# Patient Record
Sex: FEMALE | Race: BLACK OR AFRICAN AMERICAN | NOT HISPANIC OR LATINO | Employment: PART TIME | URBAN - METROPOLITAN AREA
[De-identification: names, ages, dates, MRNs, and addresses within clinical notes are randomized per-mention and may not be internally consistent; named-entity substitution may affect disease eponyms.]

---

## 2017-03-09 ENCOUNTER — OFFICE VISIT (OUTPATIENT)
Dept: OBSTETRICS AND GYNECOLOGY | Facility: CLINIC | Age: 27
End: 2017-03-09
Payer: MEDICAID

## 2017-03-09 ENCOUNTER — TELEPHONE (OUTPATIENT)
Dept: OBSTETRICS AND GYNECOLOGY | Facility: CLINIC | Age: 27
End: 2017-03-09

## 2017-03-09 ENCOUNTER — LAB VISIT (OUTPATIENT)
Dept: LAB | Facility: HOSPITAL | Age: 27
End: 2017-03-09
Attending: OBSTETRICS & GYNECOLOGY
Payer: MEDICAID

## 2017-03-09 VITALS
TEMPERATURE: 99 F | BODY MASS INDEX: 19.2 KG/M2 | HEIGHT: 66 IN | DIASTOLIC BLOOD PRESSURE: 64 MMHG | SYSTOLIC BLOOD PRESSURE: 120 MMHG | WEIGHT: 119.5 LBS

## 2017-03-09 DIAGNOSIS — O26.849 UTERINE SIZE DATE DISCREPANCY, UNSPECIFIED TRIMESTER: ICD-10-CM

## 2017-03-09 DIAGNOSIS — Z3A.09 9 WEEKS GESTATION OF PREGNANCY: Primary | ICD-10-CM

## 2017-03-09 DIAGNOSIS — N92.6 MISSED PERIOD: ICD-10-CM

## 2017-03-09 DIAGNOSIS — Z34.91 FIRST TRIMESTER PREGNANCY: ICD-10-CM

## 2017-03-09 LAB
ABO + RH BLD: NORMAL
B-HCG UR QL: POSITIVE
BASOPHILS # BLD AUTO: 0.01 K/UL
BASOPHILS NFR BLD: 0.2 %
BLD GP AB SCN CELLS X3 SERPL QL: NORMAL
CTP QC/QA: YES
DIFFERENTIAL METHOD: ABNORMAL
EOSINOPHIL # BLD AUTO: 0.1 K/UL
EOSINOPHIL NFR BLD: 2.2 %
ERYTHROCYTE [DISTWIDTH] IN BLOOD BY AUTOMATED COUNT: 13 %
HCT VFR BLD AUTO: 36.7 %
HGB BLD-MCNC: 12.9 G/DL
HGB S BLD QL SOLY: NEGATIVE
LYMPHOCYTES # BLD AUTO: 1.4 K/UL
LYMPHOCYTES NFR BLD: 24.3 %
MCH RBC QN AUTO: 28.5 PG
MCHC RBC AUTO-ENTMCNC: 35.1 %
MCV RBC AUTO: 81 FL
MONOCYTES # BLD AUTO: 0.6 K/UL
MONOCYTES NFR BLD: 10.5 %
NEUTROPHILS # BLD AUTO: 3.6 K/UL
NEUTROPHILS NFR BLD: 62.5 %
PLATELET # BLD AUTO: 334 K/UL
PMV BLD AUTO: 9.7 FL
RBC # BLD AUTO: 4.52 M/UL
WBC # BLD AUTO: 5.8 K/UL

## 2017-03-09 PROCEDURE — 86762 RUBELLA ANTIBODY: CPT

## 2017-03-09 PROCEDURE — 86803 HEPATITIS C AB TEST: CPT

## 2017-03-09 PROCEDURE — 85660 RBC SICKLE CELL TEST: CPT

## 2017-03-09 PROCEDURE — 85025 COMPLETE CBC W/AUTO DIFF WBC: CPT

## 2017-03-09 PROCEDURE — 36415 COLL VENOUS BLD VENIPUNCTURE: CPT

## 2017-03-09 PROCEDURE — 86703 HIV-1/HIV-2 1 RESULT ANTBDY: CPT

## 2017-03-09 PROCEDURE — 87340 HEPATITIS B SURFACE AG IA: CPT

## 2017-03-09 PROCEDURE — 99213 OFFICE O/P EST LOW 20 MIN: CPT | Performed by: OBSTETRICS & GYNECOLOGY

## 2017-03-09 PROCEDURE — 87591 N.GONORRHOEAE DNA AMP PROB: CPT

## 2017-03-09 PROCEDURE — 83036 HEMOGLOBIN GLYCOSYLATED A1C: CPT

## 2017-03-09 PROCEDURE — 87086 URINE CULTURE/COLONY COUNT: CPT

## 2017-03-09 PROCEDURE — 86900 BLOOD TYPING SEROLOGIC ABO: CPT

## 2017-03-09 PROCEDURE — 86592 SYPHILIS TEST NON-TREP QUAL: CPT

## 2017-03-09 PROCEDURE — 99204 OFFICE O/P NEW MOD 45 MIN: CPT | Mod: TH,S$PBB,, | Performed by: OBSTETRICS & GYNECOLOGY

## 2017-03-09 PROCEDURE — 86850 RBC ANTIBODY SCREEN: CPT

## 2017-03-09 NOTE — TELEPHONE ENCOUNTER
RETURNED CALL TO PHARMACIST AT [facility] , SHE STATED THE INSURANCE & PT PREFERS A CHEAPER PRENATAL FOR THE PT, INFORMED HER TO HAVE A PRENATAL CLOSEST TO THE ONE DR MONTGOMERY HAS ORDERED BUT WOULD BE COST EFFECTIVE FOR THE PT,  PHARMACIST STATED HER UNDERSTANDING

## 2017-03-09 NOTE — MR AVS SNAPSHOT
[facility] [place] - OB/ GYN  [address]  [address]  [place] [address]  Phone: [phone]                  Taryn Ferrara   3/9/2017 10:00 AM   Office Visit    Description:  Female : 1990   Provider:  Adonis Sims MD   Department:  [facility] OB/ GYN           Reason for Visit     Possible Pregnancy           Diagnoses this Visit        Comments    9 weeks gestation of pregnancy    -  Primary     Missed period         First trimester pregnancy         Uterine size date discrepancy, unspecified trimester                To Do List           Future Appointments        Provider Department Dept Phone    2017 9:30 AM Adonis Sims MD [facility] OB/ -[phone]      Goals (5 Years of Data)     None      Follow-Up and Disposition     Return in about 4 weeks (around 2017).      [facility]     [facility] Nurse Care [facility] -  Assistance  Registered nurses in the [facility] provide clinical advisement, health education, appointment booking, and other advisory services.  Call for this free service at [phone].             Medications           Message regarding Medications     Verify the changes and/or additions to your medication regime listed below are the same as discussed with your clinician today.  If any of these changes or additions are incorrect, please notify your healthcare provider.        STOP taking these medications     sumatriptan (IMITREX) 50 MG tablet Take 1 tablet (50 mg total) by mouth once. Take 1 dose for migraine, may repeat with 2nd dose 2 hours later if headache persists. Do not take more than 4 tablets in 1 day           Verify that the below list of medications is an accurate representation of the medications you are currently taking.  If none reported, the list may be blank. If incorrect, please contact your healthcare provider. Carry this list with you in case of emergency.           Current Medications            Clinical Reference  "Information           Your Vitals Were     BP Temp Height    120/64 (BP Location: Left arm, Patient Position: Sitting, BP Method: Manual) 98.6 °F (37 °C) (Oral) 5' 6" (1.676 m)    Weight Last Period BMI    54.2 kg (119 lb 7.8 oz) 12/01/2016 (Within Days) 19.29 kg/m2      Blood Pressure          Most Recent Value    BP  120/64      Allergies as of 3/9/2017     No Known Allergies      Immunizations Administered on Date of Encounter - 3/9/2017     None      Orders Placed During Today's Visit      Normal Orders This Visit    C. trachomatis/N. gonorrhoeae by AMP DNA Cervix     POCT urine pregnancy     Urine culture     US OB/GYN Procedure ([facility])-Today     Future Labs/Procedures Expected by Expires    CBC auto differential  3/9/2017 5/8/2018    Hemoglobin A1c  3/9/2017 5/8/2018    Hepatitis B surface antigen  3/9/2017 5/8/2018    Hepatitis C antibody  3/9/2017 3/9/2018    HIV-1 and HIV-2 antibodies  3/9/2017 5/8/2018    RPR  3/9/2017 5/8/2018    Rubella antibody, IgG  3/9/2017 5/8/2018    Sickle cell screen  3/9/2017 3/9/2018    Type & Screen  3/9/2017 5/8/2018         3/9/2017 10:26 AM - Elana Danielle MA      Component Results     Component Value Flag Ref Range Units Status    POC Preg Test, Ur Positive (A) Negative  Final     Acceptable Yes    Final            Language Assistance Services     ATTENTION: Language assistance services are available, free of charge. Please call [phone].      ATENCIÓN: Si habla español, tiene a ye disposición servicios gratuitos de asistencia lingüística. Llame al [phone].     CHÚ Ý: N?u b?n nói Ti?ng Vi?t, có các d?ch v? h? tr? ngôn ng? mi?n phí dành cho b?n. G?i s? [phone].         [facility] [place] - OB/ GYN complies with applicable Federal civil rights laws and does not discriminate on the basis of race, color, national origin, age, disability, or sex.        "

## 2017-03-09 NOTE — PROGRESS NOTES
Subjective:       Patient ID: Taryn Ferrara is a 26 y.o. female.    Chief Complaint:  Possible Pregnancy (Confirmation)      History of Present Illness  HPI  Missed Menses/ Possible Pregnancy  Patient complains of Positive home pregnancy test on 2017. She believes she could be pregnant. Pregnancy is desired. Sexual Activity: single partner, contraception: none. Current symptoms also include: breast tenderness, fatigue, frequent urination and positive home pregnancy test. Last period was normal.     Patient's last menstrual period was 2016 (within days).     By date, she should be about 14 weeks with EDC of 2017.      GYN & OB History  Patient's last menstrual period was 2016 (within days).   Date of Last Pap: No result found    OB History    Para Term  AB SAB TAB Ectopic Multiple Living   1               # Outcome Date GA Lbr Humberto/2nd Weight Sex Delivery Anes PTL Lv   1 Current                 Past Medical History:   Diagnosis Date    Migraine headache        Past Surgical History:   Procedure Laterality Date    TRACHEOSTOMY TUBE PLACEMENT         Family History   Problem Relation Age of Onset    Diabetes Neg Hx     Hypertension Neg Hx        Social History     Social History    Marital status: Single     Spouse name: N/A    Number of children: N/A    Years of education: N/A     Social History Main Topics    Smoking status: Former Smoker     Quit date: 3/2/2014    Smokeless tobacco: Never Used    Alcohol use Yes      Comment: ocassionally    Drug use: No    Sexual activity: Yes     Partners: Male     Birth control/ protection: None     Other Topics Concern    None     Social History Narrative    Together since early     He is in school.  [employer].  Working delivering fast food    She is at  [employer].  Also a security at [employer]       Current Outpatient Prescriptions   Medication Sig Dispense Refill    prenatal vitamin18-iron-FA-DSS (INATAL  ULTRA) 90-1-50 mg Tab Take 1 capsule by mouth once daily. 30 tablet 6     No current facility-administered medications for this visit.        Review of patient's allergies indicates:  No Known Allergies    Review of Systems  Review of Systems   Constitutional: Positive for fatigue. Negative for activity change, appetite change, fever and unexpected weight change.   Respiratory: Negative for cough, shortness of breath and wheezing.    Cardiovascular: Negative for chest pain and palpitations.   Gastrointestinal: Positive for abdominal pain, constipation and nausea. Negative for vomiting.   Endocrine: Negative for hot flashes.   Genitourinary: Positive for frequency, pelvic pain and vaginal discharge. Negative for dyspareunia, urgency, vaginal bleeding, dysmenorrhea and postcoital bleeding.   Musculoskeletal: Positive for back pain. Negative for myalgias.   Skin:  Negative for rash.   Neurological: Positive for headaches. Negative for seizures.   Psychiatric/Behavioral: Negative for depression and sleep disturbance. The patient is not nervous/anxious.    Breast: Negative for breast mass, breast pain and nipple discharge          Objective:    Physical Exam:   Constitutional: She appears well-developed and well-nourished. No distress.    HENT:   Head: Normocephalic and atraumatic.    Eyes: EOM are normal.    Neck: Normal range of motion.     Pulmonary/Chest: Effort normal. No respiratory distress.   Breasts: Non-tender, no engorgement, no masses, no retraction, no discharge. Negative for lymphadenopathy.         Abdominal: Soft. She exhibits no distension. There is no tenderness. There is no rebound and no guarding.     Genitourinary: Vagina normal. No vaginal discharge found.   Genitourinary Comments: Vulva without any obvious lesions.  Vaginal vault with good support.  Minimal discharge noted.  No obvious lesion.  Cervix is without any cervical motion tenderness.  No obvious lesion.  Uterus is about 10 weeks,  non-tender, normal contour.  Adnexa is without any masses or tenderness.           Musculoskeletal: Normal range of motion.       Neurological: She is alert.    Skin: Skin is warm and dry.    Psychiatric: She has a normal mood and affect.        A pelvic ultrasound performed showing a normal fetus at about 9w1d.  EDC would be 10/12/2017.       Assessment:        1. 9 weeks gestation of pregnancy    2. Missed period    3. First trimester pregnancy    4. Uterine size date discrepancy, unspecified trimester             Plan:      I have discussed with the patient her condition  She is doing well so far in her early pregnancy  She is taking over-the-counter prenatal vitamins; Inatal Ultra given.  Gonorrhea and chlamydia performed  Prenatal profile ordered  She will be back in about 2-4 weeks for follow-up

## 2017-03-09 NOTE — TELEPHONE ENCOUNTER
----- Message from Mary Jane Holland sent at 3/9/2017 11:56 AM CST -----  Contact: [facility] Perales is requesting that pt prenatal vitamin be changed to a cheaper brand. Please contact her at [phone].    Thanks

## 2017-03-10 LAB
ESTIMATED AVG GLUCOSE: 97 MG/DL
HBA1C MFR BLD HPLC: 5 %
HBV SURFACE AG SERPL QL IA: NEGATIVE
HCV AB SERPL QL IA: NEGATIVE
HIV 1+2 AB+HIV1 P24 AG SERPL QL IA: NEGATIVE
RPR SER QL: NORMAL
RUBV IGG SER-ACNC: 25.4 IU/ML
RUBV IGG SER-IMP: REACTIVE

## 2017-03-11 LAB — BACTERIA UR CULT: NORMAL

## 2017-03-13 LAB
C TRACH DNA SPEC QL NAA+PROBE: NEGATIVE
N GONORRHOEA DNA SPEC QL NAA+PROBE: NEGATIVE

## 2017-03-30 DIAGNOSIS — Z3A.09 9 WEEKS GESTATION OF PREGNANCY: ICD-10-CM

## 2017-03-30 DIAGNOSIS — Z34.91 FIRST TRIMESTER PREGNANCY: ICD-10-CM

## 2017-03-31 ENCOUNTER — TELEPHONE (OUTPATIENT)
Dept: OBSTETRICS AND GYNECOLOGY | Facility: CLINIC | Age: 27
End: 2017-03-31

## 2017-03-31 NOTE — TELEPHONE ENCOUNTER
----- Message from Yadira Vázquez sent at 3/31/2017  1:01 PM CDT -----  Contact: Pharmacy  Pharmacy calling to see if they can change prenatal vitamins to something covered by insurance. Please call [phone]      03/31/2017 1:23 PM  Told pharmacist to give pt what's covered by her inaruance

## 2017-04-06 ENCOUNTER — ROUTINE PRENATAL (OUTPATIENT)
Dept: OBSTETRICS AND GYNECOLOGY | Facility: CLINIC | Age: 27
End: 2017-04-06
Payer: MEDICAID

## 2017-04-06 VITALS — WEIGHT: 122 LBS | SYSTOLIC BLOOD PRESSURE: 112 MMHG | BODY MASS INDEX: 19.69 KG/M2 | DIASTOLIC BLOOD PRESSURE: 62 MMHG

## 2017-04-06 DIAGNOSIS — Z34.91 FIRST TRIMESTER PREGNANCY: Primary | ICD-10-CM

## 2017-04-06 DIAGNOSIS — Z3A.13 13 WEEKS GESTATION OF PREGNANCY: ICD-10-CM

## 2017-04-06 PROCEDURE — 99212 OFFICE O/P EST SF 10 MIN: CPT | Mod: PBBFAC | Performed by: OBSTETRICS & GYNECOLOGY

## 2017-04-06 PROCEDURE — 99999 PR PBB SHADOW E&M-EST. PATIENT-LVL II: CPT | Mod: PBBFAC,,, | Performed by: OBSTETRICS & GYNECOLOGY

## 2017-04-06 PROCEDURE — 99212 OFFICE O/P EST SF 10 MIN: CPT | Mod: TH,S$PBB,, | Performed by: OBSTETRICS & GYNECOLOGY

## 2017-04-06 NOTE — PROGRESS NOTES
Initial OB with MARIA LUZ 10/12/2017 based on ultrasound from 3/9/2017. Patient has no complaints. sal

## 2017-04-06 NOTE — PROGRESS NOTES
Here for follow-up  Feeling better with some weight gain    Review of prenatal profile.  All normal    Continue with prenatal vitamins  Back in 4 weeks.

## 2017-04-06 NOTE — MR AVS SNAPSHOT
[facility] - OB/ GYN  [address]  [place] [address]  Phone: [phone]                  Taryn Ferrara   2017 9:30 AM   Routine Prenatal    Description:  Female : 1990   Provider:  Adonis Sims MD   Department:  [facility] - OB/ GYN           Reason for Visit     Initial Prenatal Visit                To Do List           Future Appointments        Provider Department Dept Phone    2017 9:10 AM Adonis Sims MD [facility] [place] OB/ -[phone]      Goals (5 Years of Data)     None      [facility]     [facility] On [facility] Nurse Care Line -  Assistance  Unless otherwise directed by your provider, please contact [facility], our nurse care line that is available for  assistance.     Registered nurses in the [facility] provide: appointment scheduling, clinical advisement, health education, and other advisory services.  Call: [phone] (toll free)               Medications           Message regarding Medications     Verify the changes and/or additions to your medication regime listed below are the same as discussed with your clinician today.  If any of these changes or additions are incorrect, please notify your healthcare provider.             Verify that the below list of medications is an accurate representation of the medications you are currently taking.  If none reported, the list may be blank. If incorrect, please contact your healthcare provider. Carry this list with you in case of emergency.           Current Medications     prenatal vitamin18-iron-FA-DSS (INATAL ULTRA) 90-1-50 mg Tab Take 1 capsule by mouth once daily.           Clinical Reference Information           Prenatal Vitals     Enc. Date GA Prenatal Vitals Prenatal Pulse Pain Level Urine Albumin/Glucose Edema Presentation Dilation/Effacement/Station    17 18w0d 112/62 / 55.3 kg (122 lb)    Negative / Negative          TW.907 kg (2 lb)   Pregravid weight: 54.4 kg (120 lb)    Number of fetuses: 1       Your Vitals Were     BP Weight Last Period BMI       112/62 55.3 kg (122 lb) 12/01/2016 (Within Days) 19.69 kg/m2       Allergies as of 4/6/2017     No Known Allergies      Immunizations Administered on Date of Encounter - 4/6/2017     None      Language Assistance Services     ATTENTION: Language assistance services are available, free of charge. Please call [phone].      ATENCIÓN: Si habla español, tiene a ye disposición servicios gratuitos de asistencia lingüística. Llame al [phone].     [facility] Ý: N?u b?n nói Ti?ng Vi?t, có các d?ch v? h? tr? ngôn ng? mi?n phí dành cho b?n. G?i s? [phone].         [facility] [place] - OB/ GYN complies with applicable Federal civil rights laws and does not discriminate on the basis of race, color, national origin, age, disability, or sex.

## 2017-04-20 ENCOUNTER — PATIENT MESSAGE (OUTPATIENT)
Dept: OBSTETRICS AND GYNECOLOGY | Facility: CLINIC | Age: 27
End: 2017-04-20

## 2017-04-24 DIAGNOSIS — Z3A.09 9 WEEKS GESTATION OF PREGNANCY: ICD-10-CM

## 2017-04-24 DIAGNOSIS — Z34.91 FIRST TRIMESTER PREGNANCY: ICD-10-CM

## 2017-05-03 ENCOUNTER — LAB VISIT (OUTPATIENT)
Dept: LAB | Facility: HOSPITAL | Age: 27
End: 2017-05-03
Attending: OBSTETRICS & GYNECOLOGY
Payer: MEDICAID

## 2017-05-03 ENCOUNTER — ROUTINE PRENATAL (OUTPATIENT)
Dept: OBSTETRICS AND GYNECOLOGY | Facility: CLINIC | Age: 27
End: 2017-05-03
Payer: MEDICAID

## 2017-05-03 VITALS — SYSTOLIC BLOOD PRESSURE: 110 MMHG | BODY MASS INDEX: 20.6 KG/M2 | DIASTOLIC BLOOD PRESSURE: 64 MMHG | WEIGHT: 127.63 LBS

## 2017-05-03 DIAGNOSIS — Z34.92 SECOND TRIMESTER PREGNANCY: ICD-10-CM

## 2017-05-03 DIAGNOSIS — Z3A.17 17 WEEKS GESTATION OF PREGNANCY: ICD-10-CM

## 2017-05-03 DIAGNOSIS — Z3A.17 17 WEEKS GESTATION OF PREGNANCY: Primary | ICD-10-CM

## 2017-05-03 PROCEDURE — 99212 OFFICE O/P EST SF 10 MIN: CPT | Mod: TH,S$PBB,, | Performed by: OBSTETRICS & GYNECOLOGY

## 2017-05-03 PROCEDURE — 81511 FTL CGEN ABNOR FOUR ANAL: CPT

## 2017-05-03 PROCEDURE — 36415 COLL VENOUS BLD VENIPUNCTURE: CPT

## 2017-05-03 PROCEDURE — 99999 PR PBB SHADOW E&M-EST. PATIENT-LVL II: CPT | Mod: PBBFAC,,, | Performed by: OBSTETRICS & GYNECOLOGY

## 2017-05-03 NOTE — MR AVS SNAPSHOT
[facility] - OB/ GYN  [address]  [place] [address]  Phone: [phone]                  Taryn Ferrara   5/3/2017 2:10 PM   Routine Prenatal    Description:  Female : 1990   Provider:  Adonis Sims MD   Department:  [facility] - OB/ GYN           Reason for Visit     Routine Prenatal Visit                To Do List           Future Appointments        Provider Department Dept Phone    2017 1:10 PM Adonis Sims MD [facility] [place] [facility]/ -[phone]      Goals (5 Years of Data)     None      [facility]     [facility] On Call Nurse Care Line -  Assistance  Unless otherwise directed by your provider, please contact [facility], our nurse care line that is available for  assistance.     Registered nurses in the [facility] provide: appointment scheduling, clinical advisement, health education, and other advisory services.  Call: [phone] (toll free)               Medications           Message regarding Medications     Verify the changes and/or additions to your medication regime listed below are the same as discussed with your clinician today.  If any of these changes or additions are incorrect, please notify your healthcare provider.             Verify that the below list of medications is an accurate representation of the medications you are currently taking.  If none reported, the list may be blank. If incorrect, please contact your healthcare provider. Carry this list with you in case of emergency.           Current Medications     prenatal vitamin18-iron-FA-DSS (INATAL ULTRA) 90-1-50 mg Tab Take 1 capsule by mouth once daily.           Clinical Reference Information           Prenatal Vitals     Enc. Date GA Prenatal Vitals Prenatal Pulse Pain Level Urine Albumin/Glucose Edema Presentation Dilation/Effacement/Station    5/3/17 17w0d 110/64 / 57.9 kg (127 lb 10.3 oz)   0 Negative / Negative       17 13w1d 112/62 / 55.3 kg (122 lb)  / 153   Negative /  Negative          TWG: 3.468 kg (7 lb 10.3 oz)   Pregravid weight: 54.4 kg (120 lb)   Number of fetuses: 1       Your Vitals Were     BP Weight Last Period BMI       110/64 57.9 kg (127 lb 10.3 oz) 12/01/2016 (Within Days) 20.6 kg/m2       Allergies as of 5/3/2017     No Known Allergies      Immunizations Administered on Date of Encounter - 5/3/2017     None      Language Assistance Services     ATTENTION: Language assistance services are available, free of charge. Please call [phone].      ATENCIÓN: Si habla español, tiene a ye disposición servicios gratuitos de asistencia lingüística. Llame al [phone].     [facility] Ý: N?u b?n nói Ti?ng Vi?t, có các d?ch v? h? tr? ngôn ng? mi?n phí dành cho b?n. G?i s? [phone].         [facility] [place] - OB/ GYN complies with applicable Federal civil rights laws and does not discriminate on the basis of race, color, national origin, age, disability, or sex.

## 2017-05-05 LAB
ALPHA FETOPROTEIN MATERNAL: 73.7 NG/ML
DOWN RISK (<1:270): NORMAL
ETHNIC ORIGIN: NORMAL
GA METHOD: NORMAL
GESTATIONAL AGE (DAYS): 6
GESTATIONAL AGE (WEEKS): 21
HUMAN CHORIONIC GONADOTROPIN: 47 IU/ML
INHIBIN A: 149.4 PG/ML
INSULIN DEPEND. DIABETES: NORMAL
M.O.M. ALPHA FETOPROTEIN: 0.83
M.O.M. HCG: 2.11
M.O.M. INHIBIN A: 0.56
M.O.M. UNCONJ. ESTRIOL: 0.37
MATERNAL AGE AT EDD (YRS): 28
MATERNAL AGE FOR DOWN: NORMAL
MATERNAL WEIGHT (LBS): 128
MULTIPLE GESTATIONS: NORMAL
QUAD SCREEN INTERPRETATION: NORMAL
QUAD SCREEN: NEGATIVE
TRISOMY 18 (<1:100): NORMAL
UNCONJUGATED ESTRIOL: 1.01 NG/ML

## 2017-05-19 ENCOUNTER — PATIENT MESSAGE (OUTPATIENT)
Dept: OBSTETRICS AND GYNECOLOGY | Facility: CLINIC | Age: 27
End: 2017-05-19

## 2017-05-22 ENCOUNTER — PATIENT MESSAGE (OUTPATIENT)
Dept: OBSTETRICS AND GYNECOLOGY | Facility: CLINIC | Age: 27
End: 2017-05-22

## 2017-05-22 DIAGNOSIS — Z34.91 FIRST TRIMESTER PREGNANCY: ICD-10-CM

## 2017-05-22 DIAGNOSIS — Z3A.09 9 WEEKS GESTATION OF PREGNANCY: ICD-10-CM

## 2017-05-23 ENCOUNTER — PATIENT MESSAGE (OUTPATIENT)
Dept: OBSTETRICS AND GYNECOLOGY | Facility: CLINIC | Age: 27
End: 2017-05-23

## 2017-05-31 ENCOUNTER — ROUTINE PRENATAL (OUTPATIENT)
Dept: OBSTETRICS AND GYNECOLOGY | Facility: CLINIC | Age: 27
End: 2017-05-31
Payer: MEDICAID

## 2017-05-31 VITALS — WEIGHT: 133.5 LBS | DIASTOLIC BLOOD PRESSURE: 74 MMHG | SYSTOLIC BLOOD PRESSURE: 120 MMHG | BODY MASS INDEX: 21.55 KG/M2

## 2017-05-31 DIAGNOSIS — Z3A.21 21 WEEKS GESTATION OF PREGNANCY: ICD-10-CM

## 2017-05-31 DIAGNOSIS — Z34.92 SECOND TRIMESTER PREGNANCY: ICD-10-CM

## 2017-05-31 DIAGNOSIS — Z36.89 ENCOUNTER FOR ULTRASOUND TO CHECK FETAL GROWTH: Primary | ICD-10-CM

## 2017-05-31 PROCEDURE — 76805 OB US >/= 14 WKS SNGL FETUS: CPT | Mod: 26,S$PBB,, | Performed by: OBSTETRICS & GYNECOLOGY

## 2017-05-31 PROCEDURE — 99999 PR PBB SHADOW E&M-EST. PATIENT-LVL II: CPT | Mod: PBBFAC,,, | Performed by: OBSTETRICS & GYNECOLOGY

## 2017-05-31 PROCEDURE — 99212 OFFICE O/P EST SF 10 MIN: CPT | Mod: PBBFAC | Performed by: OBSTETRICS & GYNECOLOGY

## 2017-05-31 PROCEDURE — 99213 OFFICE O/P EST LOW 20 MIN: CPT | Mod: TH,S$PBB,25, | Performed by: OBSTETRICS & GYNECOLOGY

## 2017-05-31 PROCEDURE — 76805 OB US >/= 14 WKS SNGL FETUS: CPT | Mod: PBBFAC | Performed by: OBSTETRICS & GYNECOLOGY

## 2017-05-31 NOTE — PROGRESS NOTES
No new complaint  Normal quad    A transabdominal ultrasound performed showing normal male fetus at appropriate size    Back in 4 weeks

## 2017-06-15 ENCOUNTER — PATIENT MESSAGE (OUTPATIENT)
Dept: OBSTETRICS AND GYNECOLOGY | Facility: CLINIC | Age: 27
End: 2017-06-15

## 2017-06-15 ENCOUNTER — TELEPHONE (OUTPATIENT)
Dept: OBSTETRICS AND GYNECOLOGY | Facility: CLINIC | Age: 27
End: 2017-06-15

## 2017-06-15 DIAGNOSIS — Z3A.09 9 WEEKS GESTATION OF PREGNANCY: ICD-10-CM

## 2017-06-15 DIAGNOSIS — Z34.91 FIRST TRIMESTER PREGNANCY: ICD-10-CM

## 2017-06-19 ENCOUNTER — TELEPHONE (OUTPATIENT)
Dept: OBSTETRICS AND GYNECOLOGY | Facility: CLINIC | Age: 27
End: 2017-06-19

## 2017-06-19 NOTE — TELEPHONE ENCOUNTER
----- Message from Carol Fung sent at 6/19/2017 10:25 AM CDT -----  Contact: [facility] - Cynthia Marie with [employer] says they are unable to get patient's medication and would like to know if the doctor can change it to any other prenatal vitamin that is covered by the patient's insurance. Please call [facility]       prenatal vitamin18-iron-FA-DSS (INATAL ULTRA) 90-1-50 mg Tab      [facility] [phone]      [facility]en is aware that Rx for prenatals may be changed to a brand that is covered by her insurance. sal

## 2017-06-28 ENCOUNTER — ROUTINE PRENATAL (OUTPATIENT)
Dept: OBSTETRICS AND GYNECOLOGY | Facility: CLINIC | Age: 27
End: 2017-06-28
Payer: MEDICAID

## 2017-06-28 VITALS — SYSTOLIC BLOOD PRESSURE: 112 MMHG | DIASTOLIC BLOOD PRESSURE: 66 MMHG | BODY MASS INDEX: 22.76 KG/M2 | WEIGHT: 141 LBS

## 2017-06-28 DIAGNOSIS — Z3A.25 25 WEEKS GESTATION OF PREGNANCY: Primary | ICD-10-CM

## 2017-06-28 DIAGNOSIS — Z34.92 SECOND TRIMESTER PREGNANCY: ICD-10-CM

## 2017-06-28 PROCEDURE — 99213 OFFICE O/P EST LOW 20 MIN: CPT | Mod: TH,S$PBB,, | Performed by: OBSTETRICS & GYNECOLOGY

## 2017-06-28 PROCEDURE — 99212 OFFICE O/P EST SF 10 MIN: CPT | Mod: PBBFAC | Performed by: OBSTETRICS & GYNECOLOGY

## 2017-06-28 PROCEDURE — 99999 PR PBB SHADOW E&M-EST. PATIENT-LVL II: CPT | Mod: PBBFAC,,, | Performed by: OBSTETRICS & GYNECOLOGY

## 2017-07-07 ENCOUNTER — PATIENT MESSAGE (OUTPATIENT)
Dept: OBSTETRICS AND GYNECOLOGY | Facility: CLINIC | Age: 27
End: 2017-07-07

## 2017-07-07 DIAGNOSIS — Z3A.09 9 WEEKS GESTATION OF PREGNANCY: ICD-10-CM

## 2017-07-07 DIAGNOSIS — Z34.91 FIRST TRIMESTER PREGNANCY: ICD-10-CM

## 2017-07-12 ENCOUNTER — PATIENT MESSAGE (OUTPATIENT)
Dept: OBSTETRICS AND GYNECOLOGY | Facility: CLINIC | Age: 27
End: 2017-07-12

## 2017-07-13 ENCOUNTER — PATIENT MESSAGE (OUTPATIENT)
Dept: OBSTETRICS AND GYNECOLOGY | Facility: CLINIC | Age: 27
End: 2017-07-13

## 2017-07-13 DIAGNOSIS — Z34.91 FIRST TRIMESTER PREGNANCY: ICD-10-CM

## 2017-07-13 DIAGNOSIS — Z3A.09 9 WEEKS GESTATION OF PREGNANCY: ICD-10-CM

## 2017-07-20 ENCOUNTER — TELEPHONE (OUTPATIENT)
Dept: OBSTETRICS AND GYNECOLOGY | Facility: CLINIC | Age: 27
End: 2017-07-20

## 2017-07-20 ENCOUNTER — LAB VISIT (OUTPATIENT)
Dept: LAB | Facility: HOSPITAL | Age: 27
End: 2017-07-20
Attending: OBSTETRICS & GYNECOLOGY
Payer: MEDICAID

## 2017-07-20 DIAGNOSIS — O99.810 GLUCOSE INTOLERANCE OF PREGNANCY: Primary | ICD-10-CM

## 2017-07-20 DIAGNOSIS — Z3A.25 25 WEEKS GESTATION OF PREGNANCY: ICD-10-CM

## 2017-07-20 LAB
ABO + RH BLD: NORMAL
BASOPHILS # BLD AUTO: 0.02 K/UL
BASOPHILS NFR BLD: 0.2 %
BLD GP AB SCN CELLS X3 SERPL QL: NORMAL
DIFFERENTIAL METHOD: ABNORMAL
EOSINOPHIL # BLD AUTO: 0.3 K/UL
EOSINOPHIL NFR BLD: 3.2 %
ERYTHROCYTE [DISTWIDTH] IN BLOOD BY AUTOMATED COUNT: 13.1 %
GLUCOSE SERPL-MCNC: 165 MG/DL
HCT VFR BLD AUTO: 34.9 %
HGB BLD-MCNC: 11.6 G/DL
LYMPHOCYTES # BLD AUTO: 2 K/UL
LYMPHOCYTES NFR BLD: 24.8 %
MCH RBC QN AUTO: 28.8 PG
MCHC RBC AUTO-ENTMCNC: 33.2 G/DL
MCV RBC AUTO: 87 FL
MONOCYTES # BLD AUTO: 1.3 K/UL
MONOCYTES NFR BLD: 15.4 %
NEUTROPHILS # BLD AUTO: 4.5 K/UL
NEUTROPHILS NFR BLD: 55.5 %
PLATELET # BLD AUTO: 277 K/UL
PMV BLD AUTO: 9.9 FL
RBC # BLD AUTO: 4.03 M/UL
WBC # BLD AUTO: 8.16 K/UL

## 2017-07-20 PROCEDURE — 85025 COMPLETE CBC W/AUTO DIFF WBC: CPT

## 2017-07-20 PROCEDURE — 86901 BLOOD TYPING SEROLOGIC RH(D): CPT

## 2017-07-20 PROCEDURE — 82950 GLUCOSE TEST: CPT

## 2017-07-20 PROCEDURE — 86900 BLOOD TYPING SEROLOGIC ABO: CPT

## 2017-07-21 NOTE — TELEPHONE ENCOUNTER
EB for pt to return phone call. sophia    Pt return phone call and result was given to pt.  Pt is aware of failed 1 hr GTT. Pt admit to eating 3 hours prior to test.  She was instructed to fast for 8 hours before having her 3 hr GTT done.  Pt voiced understanding. sophia

## 2017-07-24 ENCOUNTER — LAB VISIT (OUTPATIENT)
Dept: LAB | Facility: HOSPITAL | Age: 27
End: 2017-07-24
Attending: OBSTETRICS & GYNECOLOGY
Payer: MEDICAID

## 2017-07-24 DIAGNOSIS — O99.810 GLUCOSE INTOLERANCE OF PREGNANCY: ICD-10-CM

## 2017-07-24 LAB
GLUCOSE SERPL-MCNC: 152 MG/DL
GLUCOSE SERPL-MCNC: 154 MG/DL
GLUCOSE SERPL-MCNC: 69 MG/DL
GLUCOSE SERPL-MCNC: 82 MG/DL

## 2017-07-24 PROCEDURE — 82951 GLUCOSE TOLERANCE TEST (GTT): CPT

## 2017-07-24 PROCEDURE — 36415 COLL VENOUS BLD VENIPUNCTURE: CPT

## 2017-07-27 ENCOUNTER — ROUTINE PRENATAL (OUTPATIENT)
Dept: OBSTETRICS AND GYNECOLOGY | Facility: CLINIC | Age: 27
End: 2017-07-27
Payer: MEDICAID

## 2017-07-27 ENCOUNTER — PATIENT MESSAGE (OUTPATIENT)
Dept: OBSTETRICS AND GYNECOLOGY | Facility: CLINIC | Age: 27
End: 2017-07-27

## 2017-07-27 VITALS
BODY MASS INDEX: 23.09 KG/M2 | DIASTOLIC BLOOD PRESSURE: 60 MMHG | SYSTOLIC BLOOD PRESSURE: 110 MMHG | WEIGHT: 143.06 LBS

## 2017-07-27 DIAGNOSIS — Z3A.29 29 WEEKS GESTATION OF PREGNANCY: ICD-10-CM

## 2017-07-27 DIAGNOSIS — Z34.93 THIRD TRIMESTER PREGNANCY: ICD-10-CM

## 2017-07-27 DIAGNOSIS — O99.810 GLUCOSE INTOLERANCE OF PREGNANCY: Primary | ICD-10-CM

## 2017-07-27 PROCEDURE — 99212 OFFICE O/P EST SF 10 MIN: CPT | Mod: PBBFAC | Performed by: OBSTETRICS & GYNECOLOGY

## 2017-07-27 PROCEDURE — 99213 OFFICE O/P EST LOW 20 MIN: CPT | Mod: TH,S$PBB,, | Performed by: OBSTETRICS & GYNECOLOGY

## 2017-07-27 PROCEDURE — 99999 PR PBB SHADOW E&M-EST. PATIENT-LVL II: CPT | Mod: PBBFAC,,, | Performed by: OBSTETRICS & GYNECOLOGY

## 2017-07-27 NOTE — PROGRESS NOTES
OB here for routine exam. Pt feeling well today and wants to know results of her 3 hr GTT. hussaintn

## 2017-08-10 ENCOUNTER — ROUTINE PRENATAL (OUTPATIENT)
Dept: OBSTETRICS AND GYNECOLOGY | Facility: CLINIC | Age: 27
End: 2017-08-10
Payer: MEDICAID

## 2017-08-10 ENCOUNTER — CLINICAL SUPPORT (OUTPATIENT)
Dept: OBSTETRICS AND GYNECOLOGY | Facility: CLINIC | Age: 27
End: 2017-08-10
Payer: MEDICAID

## 2017-08-10 VITALS
WEIGHT: 145.75 LBS | BODY MASS INDEX: 23.52 KG/M2 | DIASTOLIC BLOOD PRESSURE: 74 MMHG | SYSTOLIC BLOOD PRESSURE: 120 MMHG

## 2017-08-10 DIAGNOSIS — Z3A.31 31 WEEKS GESTATION OF PREGNANCY: Primary | ICD-10-CM

## 2017-08-10 DIAGNOSIS — Z34.93 THIRD TRIMESTER PREGNANCY: ICD-10-CM

## 2017-08-10 DIAGNOSIS — Z23 NEED FOR TDAP VACCINATION: ICD-10-CM

## 2017-08-10 DIAGNOSIS — Z23 NEED FOR TDAP VACCINATION: Primary | ICD-10-CM

## 2017-08-10 DIAGNOSIS — Z34.91 FIRST TRIMESTER PREGNANCY: ICD-10-CM

## 2017-08-10 DIAGNOSIS — O99.810 GLUCOSE INTOLERANCE OF PREGNANCY: ICD-10-CM

## 2017-08-10 DIAGNOSIS — Z3A.09 9 WEEKS GESTATION OF PREGNANCY: ICD-10-CM

## 2017-08-10 PROCEDURE — 99999 PR PBB SHADOW E&M-EST. PATIENT-LVL II: CPT | Mod: PBBFAC,,, | Performed by: OBSTETRICS & GYNECOLOGY

## 2017-08-10 PROCEDURE — 90715 TDAP VACCINE 7 YRS/> IM: CPT | Mod: PBBFAC

## 2017-08-10 PROCEDURE — 90471 IMMUNIZATION ADMIN: CPT | Mod: PBBFAC

## 2017-08-10 PROCEDURE — 99213 OFFICE O/P EST LOW 20 MIN: CPT | Mod: TH,S$PBB,, | Performed by: OBSTETRICS & GYNECOLOGY

## 2017-08-10 PROCEDURE — 3008F BODY MASS INDEX DOCD: CPT | Mod: ,,, | Performed by: OBSTETRICS & GYNECOLOGY

## 2017-08-10 NOTE — PROGRESS NOTES
No new complaint    Discussed and given Tdap today  Wants to know if she can take maternity leave    Back in 2 weeks

## 2017-08-14 ENCOUNTER — TELEPHONE (OUTPATIENT)
Dept: OBSTETRICS AND GYNECOLOGY | Facility: CLINIC | Age: 27
End: 2017-08-14

## 2017-08-14 NOTE — TELEPHONE ENCOUNTER
Spoke with pharmacy. PNV rx that Dr. Sims submitted is not available. Authorization given to change to rx that is covered by her insurance.

## 2017-08-14 NOTE — TELEPHONE ENCOUNTER
----- Message from Radha Oswald sent at 8/14/2017 10:52 AM CDT -----  Contact: Pharmacy  Pharmacy called stating that prenatal vits18-iron-folic-dss (INATAL ULTRA) 90-1-50 mg Tab is unavailable. Please send over new script.    Thanks!

## 2017-08-24 ENCOUNTER — ROUTINE PRENATAL (OUTPATIENT)
Dept: OBSTETRICS AND GYNECOLOGY | Facility: CLINIC | Age: 27
End: 2017-08-24
Payer: MEDICAID

## 2017-08-24 VITALS
SYSTOLIC BLOOD PRESSURE: 120 MMHG | BODY MASS INDEX: 23.77 KG/M2 | DIASTOLIC BLOOD PRESSURE: 70 MMHG | WEIGHT: 147.25 LBS

## 2017-08-24 DIAGNOSIS — Z34.93 THIRD TRIMESTER PREGNANCY: ICD-10-CM

## 2017-08-24 DIAGNOSIS — Z3A.33 33 WEEKS GESTATION OF PREGNANCY: Primary | ICD-10-CM

## 2017-08-24 PROCEDURE — 99212 OFFICE O/P EST SF 10 MIN: CPT | Mod: PBBFAC | Performed by: OBSTETRICS & GYNECOLOGY

## 2017-08-24 PROCEDURE — 99213 OFFICE O/P EST LOW 20 MIN: CPT | Mod: TH,S$PBB,, | Performed by: OBSTETRICS & GYNECOLOGY

## 2017-08-24 PROCEDURE — 99999 PR PBB SHADOW E&M-EST. PATIENT-LVL II: CPT | Mod: PBBFAC,,, | Performed by: OBSTETRICS & GYNECOLOGY

## 2017-08-24 PROCEDURE — 3008F BODY MASS INDEX DOCD: CPT | Mod: ,,, | Performed by: OBSTETRICS & GYNECOLOGY

## 2017-08-25 ENCOUNTER — PATIENT MESSAGE (OUTPATIENT)
Dept: OBSTETRICS AND GYNECOLOGY | Facility: CLINIC | Age: 27
End: 2017-08-25

## 2017-09-07 ENCOUNTER — ROUTINE PRENATAL (OUTPATIENT)
Dept: OBSTETRICS AND GYNECOLOGY | Facility: CLINIC | Age: 27
End: 2017-09-07
Payer: MEDICAID

## 2017-09-07 ENCOUNTER — LAB VISIT (OUTPATIENT)
Dept: LAB | Facility: HOSPITAL | Age: 27
End: 2017-09-07
Attending: OBSTETRICS & GYNECOLOGY
Payer: MEDICAID

## 2017-09-07 VITALS
BODY MASS INDEX: 24.09 KG/M2 | DIASTOLIC BLOOD PRESSURE: 70 MMHG | SYSTOLIC BLOOD PRESSURE: 122 MMHG | WEIGHT: 149.25 LBS

## 2017-09-07 DIAGNOSIS — Z3A.35 35 WEEKS GESTATION OF PREGNANCY: ICD-10-CM

## 2017-09-07 DIAGNOSIS — Z34.93 THIRD TRIMESTER PREGNANCY: ICD-10-CM

## 2017-09-07 DIAGNOSIS — Z3A.35 35 WEEKS GESTATION OF PREGNANCY: Primary | ICD-10-CM

## 2017-09-07 PROCEDURE — 3008F BODY MASS INDEX DOCD: CPT | Mod: ,,, | Performed by: OBSTETRICS & GYNECOLOGY

## 2017-09-07 PROCEDURE — 36415 COLL VENOUS BLD VENIPUNCTURE: CPT

## 2017-09-07 PROCEDURE — 99999 PR PBB SHADOW E&M-EST. PATIENT-LVL II: CPT | Mod: PBBFAC,,, | Performed by: OBSTETRICS & GYNECOLOGY

## 2017-09-07 PROCEDURE — 87081 CULTURE SCREEN ONLY: CPT

## 2017-09-07 PROCEDURE — 86703 HIV-1/HIV-2 1 RESULT ANTBDY: CPT

## 2017-09-07 PROCEDURE — 99213 OFFICE O/P EST LOW 20 MIN: CPT | Mod: TH,S$PBB,, | Performed by: OBSTETRICS & GYNECOLOGY

## 2017-09-07 PROCEDURE — 99212 OFFICE O/P EST SF 10 MIN: CPT | Mod: PBBFAC | Performed by: OBSTETRICS & GYNECOLOGY

## 2017-09-08 LAB — HIV 1+2 AB+HIV1 P24 AG SERPL QL IA: NEGATIVE

## 2017-09-09 LAB — BACTERIA SPEC AEROBE CULT: NORMAL

## 2017-09-14 ENCOUNTER — ROUTINE PRENATAL (OUTPATIENT)
Dept: OBSTETRICS AND GYNECOLOGY | Facility: CLINIC | Age: 27
End: 2017-09-14
Payer: MEDICAID

## 2017-09-14 VITALS
SYSTOLIC BLOOD PRESSURE: 120 MMHG | BODY MASS INDEX: 24.23 KG/M2 | WEIGHT: 150.13 LBS | DIASTOLIC BLOOD PRESSURE: 64 MMHG

## 2017-09-14 DIAGNOSIS — Z3A.36 36 WEEKS GESTATION OF PREGNANCY: Primary | ICD-10-CM

## 2017-09-14 DIAGNOSIS — Z34.93 THIRD TRIMESTER PREGNANCY: ICD-10-CM

## 2017-09-14 PROCEDURE — 3008F BODY MASS INDEX DOCD: CPT | Mod: ,,, | Performed by: OBSTETRICS & GYNECOLOGY

## 2017-09-14 PROCEDURE — 99999 PR PBB SHADOW E&M-EST. PATIENT-LVL II: CPT | Mod: PBBFAC,,, | Performed by: OBSTETRICS & GYNECOLOGY

## 2017-09-14 PROCEDURE — 99212 OFFICE O/P EST SF 10 MIN: CPT | Mod: PBBFAC | Performed by: OBSTETRICS & GYNECOLOGY

## 2017-09-14 PROCEDURE — 99213 OFFICE O/P EST LOW 20 MIN: CPT | Mod: TH,S$PBB,, | Performed by: OBSTETRICS & GYNECOLOGY

## 2017-09-21 ENCOUNTER — ROUTINE PRENATAL (OUTPATIENT)
Dept: OBSTETRICS AND GYNECOLOGY | Facility: CLINIC | Age: 27
End: 2017-09-21
Payer: MEDICAID

## 2017-09-21 VITALS
WEIGHT: 151.69 LBS | DIASTOLIC BLOOD PRESSURE: 70 MMHG | BODY MASS INDEX: 24.48 KG/M2 | SYSTOLIC BLOOD PRESSURE: 110 MMHG

## 2017-09-21 DIAGNOSIS — Z3A.37 37 WEEKS GESTATION OF PREGNANCY: Primary | ICD-10-CM

## 2017-09-21 DIAGNOSIS — Z34.93 THIRD TRIMESTER PREGNANCY: ICD-10-CM

## 2017-09-21 PROCEDURE — 99212 OFFICE O/P EST SF 10 MIN: CPT | Mod: PBBFAC | Performed by: OBSTETRICS & GYNECOLOGY

## 2017-09-21 PROCEDURE — 99213 OFFICE O/P EST LOW 20 MIN: CPT | Mod: TH,S$PBB,, | Performed by: OBSTETRICS & GYNECOLOGY

## 2017-09-21 PROCEDURE — 3008F BODY MASS INDEX DOCD: CPT | Mod: ,,, | Performed by: OBSTETRICS & GYNECOLOGY

## 2017-09-21 PROCEDURE — 99999 PR PBB SHADOW E&M-EST. PATIENT-LVL II: CPT | Mod: PBBFAC,,, | Performed by: OBSTETRICS & GYNECOLOGY

## 2017-09-28 ENCOUNTER — ROUTINE PRENATAL (OUTPATIENT)
Dept: OBSTETRICS AND GYNECOLOGY | Facility: CLINIC | Age: 27
End: 2017-09-28
Payer: MEDICAID

## 2017-09-28 VITALS
SYSTOLIC BLOOD PRESSURE: 110 MMHG | WEIGHT: 152.75 LBS | DIASTOLIC BLOOD PRESSURE: 66 MMHG | BODY MASS INDEX: 24.66 KG/M2

## 2017-09-28 DIAGNOSIS — Z34.93 THIRD TRIMESTER PREGNANCY: ICD-10-CM

## 2017-09-28 DIAGNOSIS — Z3A.38 38 WEEKS GESTATION OF PREGNANCY: Primary | ICD-10-CM

## 2017-09-28 PROCEDURE — 99213 OFFICE O/P EST LOW 20 MIN: CPT | Mod: TH,S$PBB,, | Performed by: OBSTETRICS & GYNECOLOGY

## 2017-09-28 PROCEDURE — 3008F BODY MASS INDEX DOCD: CPT | Mod: ,,, | Performed by: OBSTETRICS & GYNECOLOGY

## 2017-09-28 PROCEDURE — 99212 OFFICE O/P EST SF 10 MIN: CPT | Mod: PBBFAC | Performed by: OBSTETRICS & GYNECOLOGY

## 2017-09-28 PROCEDURE — 99999 PR PBB SHADOW E&M-EST. PATIENT-LVL II: CPT | Mod: PBBFAC,,, | Performed by: OBSTETRICS & GYNECOLOGY

## 2017-09-28 NOTE — PROGRESS NOTES
No new complaint  No bleeding or rupture of membranes    Cervix only at 1 cm    Labor precautions  Back next week

## 2017-10-05 ENCOUNTER — ROUTINE PRENATAL (OUTPATIENT)
Dept: OBSTETRICS AND GYNECOLOGY | Facility: CLINIC | Age: 27
End: 2017-10-05
Payer: MEDICAID

## 2017-10-05 VITALS
BODY MASS INDEX: 24.48 KG/M2 | SYSTOLIC BLOOD PRESSURE: 112 MMHG | DIASTOLIC BLOOD PRESSURE: 68 MMHG | WEIGHT: 151.69 LBS

## 2017-10-05 DIAGNOSIS — Z3A.39 39 WEEKS GESTATION OF PREGNANCY: Primary | ICD-10-CM

## 2017-10-05 DIAGNOSIS — Z34.93 THIRD TRIMESTER PREGNANCY: ICD-10-CM

## 2017-10-05 PROCEDURE — 99999 PR PBB SHADOW E&M-EST. PATIENT-LVL II: CPT | Mod: PBBFAC,,, | Performed by: OBSTETRICS & GYNECOLOGY

## 2017-10-05 PROCEDURE — 99213 OFFICE O/P EST LOW 20 MIN: CPT | Mod: TH,S$PBB,, | Performed by: OBSTETRICS & GYNECOLOGY

## 2017-10-05 PROCEDURE — 99212 OFFICE O/P EST SF 10 MIN: CPT | Mod: PBBFAC | Performed by: OBSTETRICS & GYNECOLOGY

## 2017-10-05 RX ORDER — BUTORPHANOL TARTRATE 1 MG/ML
2 INJECTION INTRAMUSCULAR; INTRAVENOUS
Status: CANCELLED | OUTPATIENT
Start: 2017-10-05

## 2017-10-05 RX ORDER — SODIUM CHLORIDE, SODIUM LACTATE, POTASSIUM CHLORIDE, CALCIUM CHLORIDE 600; 310; 30; 20 MG/100ML; MG/100ML; MG/100ML; MG/100ML
INJECTION, SOLUTION INTRAVENOUS CONTINUOUS
Status: CANCELLED | OUTPATIENT
Start: 2017-10-05

## 2017-10-05 RX ORDER — ONDANSETRON 4 MG/1
8 TABLET, ORALLY DISINTEGRATING ORAL EVERY 8 HOURS PRN
Status: CANCELLED | OUTPATIENT
Start: 2017-10-05

## 2017-10-05 RX ORDER — MISOPROSTOL 100 UG/1
200 TABLET ORAL
Status: CANCELLED | OUTPATIENT
Start: 2017-10-05

## 2017-10-05 NOTE — PROGRESS NOTES
No new complaint    Patient requests induction of labor. Risks and benefits of induction versus awaiting spontaneous labor discussed.  Procedure of Cervidil induction explained and discussed.  Questions answered.  Consents signed.  Orders written.  Patient will go over to the hospital for preop now.  She will be admitted for induction on Monday, 10/9/2017 at 1300.

## 2017-10-08 ENCOUNTER — ANESTHESIA EVENT (OUTPATIENT)
Dept: OBSTETRICS AND GYNECOLOGY | Facility: HOSPITAL | Age: 27
End: 2017-10-08
Payer: MEDICAID

## 2017-10-08 ENCOUNTER — HOSPITAL ENCOUNTER (INPATIENT)
Facility: HOSPITAL | Age: 27
LOS: 2 days | Discharge: HOME | End: 2017-10-10
Attending: OBSTETRICS & GYNECOLOGY | Admitting: OBSTETRICS & GYNECOLOGY
Payer: MEDICAID

## 2017-10-08 ENCOUNTER — ANESTHESIA (OUTPATIENT)
Dept: OBSTETRICS AND GYNECOLOGY | Facility: HOSPITAL | Age: 27
End: 2017-10-08
Payer: MEDICAID

## 2017-10-08 DIAGNOSIS — Z3A.39 39 WEEKS GESTATION OF PREGNANCY: ICD-10-CM

## 2017-10-08 LAB
ABO + RH BLD: NORMAL
BASOPHILS # BLD AUTO: 0.01 K/UL
BASOPHILS NFR BLD: 0.1 %
BLD GP AB SCN CELLS X3 SERPL QL: NORMAL
DIFFERENTIAL METHOD: NORMAL
EOSINOPHIL # BLD AUTO: 0.2 K/UL
EOSINOPHIL NFR BLD: 1.8 %
ERYTHROCYTE [DISTWIDTH] IN BLOOD BY AUTOMATED COUNT: 14.3 %
HCT VFR BLD AUTO: 40.1 %
HGB BLD-MCNC: 13.7 G/DL
LYMPHOCYTES # BLD AUTO: 2.1 K/UL
LYMPHOCYTES NFR BLD: 23.6 %
MCH RBC QN AUTO: 28.4 PG
MCHC RBC AUTO-ENTMCNC: 34.2 G/DL
MCV RBC AUTO: 83 FL
MONOCYTES # BLD AUTO: 1 K/UL
MONOCYTES NFR BLD: 11.2 %
NEUTROPHILS # BLD AUTO: 5.6 K/UL
NEUTROPHILS NFR BLD: 63.3 %
PLATELET # BLD AUTO: 260 K/UL
PMV BLD AUTO: 10.6 FL
RBC # BLD AUTO: 4.83 M/UL
WBC # BLD AUTO: 8.89 K/UL

## 2017-10-08 PROCEDURE — 72100002 HC LABOR CARE, 1ST 8 HOURS

## 2017-10-08 PROCEDURE — 86592 SYPHILIS TEST NON-TREP QUAL: CPT

## 2017-10-08 PROCEDURE — 25000003 PHARM REV CODE 250: Performed by: ANESTHESIOLOGY

## 2017-10-08 PROCEDURE — 27200710 HC EPIDURAL INFUSION PUMP SET: Performed by: ANESTHESIOLOGY

## 2017-10-08 PROCEDURE — 25000003 PHARM REV CODE 250: Performed by: OBSTETRICS & GYNECOLOGY

## 2017-10-08 PROCEDURE — 72200006 HC VAGINAL DELIVERY LEVEL III

## 2017-10-08 PROCEDURE — 85025 COMPLETE CBC W/AUTO DIFF WBC: CPT

## 2017-10-08 PROCEDURE — 99211 OFF/OP EST MAY X REQ PHY/QHP: CPT

## 2017-10-08 PROCEDURE — 62326 NJX INTERLAMINAR LMBR/SAC: CPT | Performed by: ANESTHESIOLOGY

## 2017-10-08 PROCEDURE — S0020 INJECTION, BUPIVICAINE HYDRO: HCPCS | Performed by: ANESTHESIOLOGY

## 2017-10-08 PROCEDURE — 51702 INSERT TEMP BLADDER CATH: CPT

## 2017-10-08 PROCEDURE — 86850 RBC ANTIBODY SCREEN: CPT

## 2017-10-08 PROCEDURE — 27800517 HC TRAY,EPIDURAL-CONTINUOUS: Performed by: ANESTHESIOLOGY

## 2017-10-08 PROCEDURE — 86900 BLOOD TYPING SEROLOGIC ABO: CPT

## 2017-10-08 PROCEDURE — 36415 COLL VENOUS BLD VENIPUNCTURE: CPT

## 2017-10-08 PROCEDURE — 63600175 PHARM REV CODE 636 W HCPCS: Performed by: OBSTETRICS & GYNECOLOGY

## 2017-10-08 PROCEDURE — 59409 OBSTETRICAL CARE: CPT | Mod: AA,,, | Performed by: ANESTHESIOLOGY

## 2017-10-08 PROCEDURE — 11000001 HC ACUTE MED/SURG PRIVATE ROOM

## 2017-10-08 PROCEDURE — 59409 OBSTETRICAL CARE: CPT | Mod: GB,,, | Performed by: OBSTETRICS & GYNECOLOGY

## 2017-10-08 RX ORDER — ONDANSETRON 8 MG/1
8 TABLET, ORALLY DISINTEGRATING ORAL EVERY 8 HOURS PRN
Status: DISCONTINUED | OUTPATIENT
Start: 2017-10-08 | End: 2017-10-08

## 2017-10-08 RX ORDER — ACETAMINOPHEN 325 MG/1
650 TABLET ORAL EVERY 6 HOURS PRN
Status: DISCONTINUED | OUTPATIENT
Start: 2017-10-08 | End: 2017-10-10 | Stop reason: HOSPADM

## 2017-10-08 RX ORDER — MISOPROSTOL 200 UG/1
200 TABLET ORAL
Status: DISCONTINUED | OUTPATIENT
Start: 2017-10-08 | End: 2017-10-08

## 2017-10-08 RX ORDER — ONDANSETRON 8 MG/1
8 TABLET, ORALLY DISINTEGRATING ORAL EVERY 8 HOURS PRN
Status: DISCONTINUED | OUTPATIENT
Start: 2017-10-08 | End: 2017-10-10 | Stop reason: HOSPADM

## 2017-10-08 RX ORDER — SIMETHICONE 80 MG
1 TABLET,CHEWABLE ORAL EVERY 6 HOURS PRN
Status: DISCONTINUED | OUTPATIENT
Start: 2017-10-08 | End: 2017-10-10 | Stop reason: HOSPADM

## 2017-10-08 RX ORDER — BUTORPHANOL TARTRATE 2 MG/ML
2 INJECTION INTRAMUSCULAR; INTRAVENOUS
Status: DISCONTINUED | OUTPATIENT
Start: 2017-10-08 | End: 2017-10-08

## 2017-10-08 RX ORDER — OXYCODONE AND ACETAMINOPHEN 10; 325 MG/1; MG/1
1 TABLET ORAL EVERY 4 HOURS PRN
Status: DISCONTINUED | OUTPATIENT
Start: 2017-10-08 | End: 2017-10-10 | Stop reason: HOSPADM

## 2017-10-08 RX ORDER — FENTANYL/BUPIVACAINE/NS/PF 2MCG/ML-.1
PLASTIC BAG, INJECTION (ML) INJECTION CONTINUOUS PRN
Status: DISCONTINUED | OUTPATIENT
Start: 2017-10-08 | End: 2017-10-08

## 2017-10-08 RX ORDER — LIDOCAINE HYDROCHLORIDE AND EPINEPHRINE 15; 5 MG/ML; UG/ML
INJECTION, SOLUTION EPIDURAL
Status: DISCONTINUED | OUTPATIENT
Start: 2017-10-08 | End: 2017-10-08

## 2017-10-08 RX ORDER — BUPIVACAINE HYDROCHLORIDE 5 MG/ML
INJECTION, SOLUTION PERINEURAL
Status: DISCONTINUED | OUTPATIENT
Start: 2017-10-08 | End: 2017-10-08

## 2017-10-08 RX ORDER — OXYTOCIN/RINGER'S LACTATE 20/1000 ML
41.65 PLASTIC BAG, INJECTION (ML) INTRAVENOUS CONTINUOUS
Status: DISPENSED | OUTPATIENT
Start: 2017-10-08 | End: 2017-10-09

## 2017-10-08 RX ORDER — DIPHENHYDRAMINE HCL 25 MG
25 CAPSULE ORAL EVERY 4 HOURS PRN
Status: DISCONTINUED | OUTPATIENT
Start: 2017-10-08 | End: 2017-10-10 | Stop reason: HOSPADM

## 2017-10-08 RX ORDER — OXYCODONE AND ACETAMINOPHEN 5; 325 MG/1; MG/1
1 TABLET ORAL EVERY 4 HOURS PRN
Status: DISCONTINUED | OUTPATIENT
Start: 2017-10-08 | End: 2017-10-10 | Stop reason: HOSPADM

## 2017-10-08 RX ORDER — IBUPROFEN 600 MG/1
600 TABLET ORAL EVERY 6 HOURS PRN
Status: DISCONTINUED | OUTPATIENT
Start: 2017-10-08 | End: 2017-10-10 | Stop reason: HOSPADM

## 2017-10-08 RX ORDER — SODIUM CHLORIDE, SODIUM LACTATE, POTASSIUM CHLORIDE, CALCIUM CHLORIDE 600; 310; 30; 20 MG/100ML; MG/100ML; MG/100ML; MG/100ML
INJECTION, SOLUTION INTRAVENOUS CONTINUOUS
Status: DISCONTINUED | OUTPATIENT
Start: 2017-10-08 | End: 2017-10-08

## 2017-10-08 RX ORDER — AMOXICILLIN 250 MG
1 CAPSULE ORAL NIGHTLY
Status: DISCONTINUED | OUTPATIENT
Start: 2017-10-08 | End: 2017-10-10 | Stop reason: HOSPADM

## 2017-10-08 RX ORDER — OXYTOCIN/RINGER'S LACTATE 20/1000 ML
2 PLASTIC BAG, INJECTION (ML) INTRAVENOUS CONTINUOUS
Status: DISCONTINUED | OUTPATIENT
Start: 2017-10-08 | End: 2017-10-08

## 2017-10-08 RX ORDER — MISOPROSTOL 200 UG/1
200 TABLET ORAL ONCE
Status: DISCONTINUED | OUTPATIENT
Start: 2017-10-08 | End: 2017-10-10 | Stop reason: HOSPADM

## 2017-10-08 RX ADMIN — LIDOCAINE HYDROCHLORIDE,EPINEPHRINE BITARTRATE 3 ML: 15; .005 INJECTION, SOLUTION EPIDURAL; INFILTRATION; INTRACAUDAL; PERINEURAL at 11:10

## 2017-10-08 RX ADMIN — SODIUM CHLORIDE, SODIUM LACTATE, POTASSIUM CHLORIDE, AND CALCIUM CHLORIDE: 600; 310; 30; 20 INJECTION, SOLUTION INTRAVENOUS at 11:10

## 2017-10-08 RX ADMIN — OXYCODONE AND ACETAMINOPHEN 1 TABLET: 5; 325 TABLET ORAL at 09:10

## 2017-10-08 RX ADMIN — SODIUM CHLORIDE, SODIUM LACTATE, POTASSIUM CHLORIDE, AND CALCIUM CHLORIDE: 600; 310; 30; 20 INJECTION, SOLUTION INTRAVENOUS at 01:10

## 2017-10-08 RX ADMIN — DOCUSATE SODIUM AND SENNOSIDES 1 TABLET: 8.6; 5 TABLET, FILM COATED ORAL at 09:10

## 2017-10-08 RX ADMIN — SODIUM CHLORIDE, SODIUM LACTATE, POTASSIUM CHLORIDE, AND CALCIUM CHLORIDE 1000 ML: 600; 310; 30; 20 INJECTION, SOLUTION INTRAVENOUS at 10:10

## 2017-10-08 RX ADMIN — BUPIVACAINE HYDROCHLORIDE 3 ML: 5 INJECTION, SOLUTION PERINEURAL at 11:10

## 2017-10-08 RX ADMIN — Medication 41.65 MILLI-UNITS/MIN: at 05:10

## 2017-10-08 RX ADMIN — Medication 2 MILLI-UNITS/MIN: at 01:10

## 2017-10-08 RX ADMIN — Medication 10 ML/HR: at 11:10

## 2017-10-08 NOTE — ANESTHESIA PREPROCEDURE EVALUATION
10/08/2017  Taryn Ferrara is a 27 y.o., female.    Anesthesia Evaluation     I have reviewed the Nursing Notes.      Review of Systems  Anesthesia Hx:  No problems with previous Anesthesia   Social:  Former Smoker    EENT/Dental:   Hx tracheostomy tube   Cardiovascular:  Cardiovascular Normal Exercise tolerance: good     Pulmonary:  Pulmonary Normal    Renal/:  Renal/ Normal     Hepatic/GI:   No Bowel Prep. Denies PUD. Denies Liver Disease. Denies Hepatitis.    Neurological:   Denies CVA. Headaches    Endocrine:  Endocrine Normal        Physical Exam  General:  Well nourished    Airway/Jaw/Neck:   Trach scar          Mental Status:  Mental Status Findings: Normal        Anesthesia Plan  Type of Anesthesia, risks & benefits discussed:  Anesthesia Type:  epidural  Patient's Preference:   Intra-op Monitoring Plan: standard ASA monitors  Intra-op Monitoring Plan Comments:   Post Op Pain Control Plan:   Post Op Pain Control Plan Comments:   Induction:    Beta Blocker:  Patient is not currently on a Beta-Blocker (No further documentation required).       Informed Consent: Patient understands risks and agrees with Anesthesia plan.  Questions answered. Anesthesia consent signed with patient.  ASA Score: 2     Day of Surgery Review of History & Physical:    H&P update referred to the provider.         Ready For Surgery From Anesthesia Perspective.

## 2017-10-08 NOTE — H&P
[facility]  Obstetrics  History & Physical    Patient Name: Taryn Ferrara  MRN: [id number]  Admission Date: 10/8/2017  Primary Care Provider: Adonis Sims MD    Subjective:     Principal Problem:39 weeks gestation of pregnancy    History of Present Illness:  26 yo G1 at 39w4d  Admitted today, 10/8/2017 in active labor with possible SROM and cervical change with regular contractions.  No vaginal bleeding  Good fetal movements.      Obstetric HPI:  Patient reports Date/time of onset: Yes contractions, active fetal movement, Yes vaginal bleeding , Yes loss of fluid     This pregnancy has been complicated by Abnormal [place]    Obstetric History       T0      L0     SAB0   TAB0   Ectopic0   Multiple0   Live Births0       # Outcome Date GA Lbr Humberto/2nd Weight Sex Delivery Anes PTL Lv   1 Current                 Past Medical History:   Diagnosis Date    Migraine headache      Past Surgical History:   Procedure Laterality Date    TRACHEOSTOMY TUBE PLACEMENT         PTA Medications   Medication Sig    prenatal vits18-iron-folic-dss (INATAL ULTRA) 90-1-50 mg Tab Take 1 capsule by mouth once daily.       Review of patient's allergies indicates:  No Known Allergies     Family History     None        Social History Main Topics    Smoking status: Former Smoker     Quit date: 3/2/2014    Smokeless tobacco: Never Used    Alcohol use Yes      Comment: ocassionally    Drug use: No    Sexual activity: Yes     Partners: Male     Birth control/ protection: None     Review of Systems   Constitutional: Positive for fatigue. Negative for activity change, appetite change, fever and unexpected weight change.   Respiratory: Negative for cough, shortness of breath and wheezing.    Cardiovascular: Negative for chest pain and palpitations.   Gastrointestinal: Positive for abdominal pain and nausea. Negative for vomiting.   Endocrine: Negative for hot flashes.   Genitourinary: Positive for frequency, pelvic  pain and vaginal discharge. Negative for dyspareunia, urgency, vaginal bleeding, dysmenorrhea and postcoital bleeding.   Musculoskeletal: Positive for back pain. Negative for myalgias.   Skin:  Negative for rash.   Neurological: Positive for headaches. Negative for seizures.   Psychiatric/Behavioral: Negative for depression and sleep disturbance. The patient is not nervous/anxious.    Breast: Negative for breast mass, breast pain and nipple discharge     Objective:     Vital Signs (Most Recent):  Pulse: 76 (10/08/17 1330)  BP: 116/73 (10/08/17 1318)  SpO2: (!) 92 % (10/08/17 1330) Vital Signs (24h Range):  Pulse:  [67-94] 76  SpO2:  [88 %-99 %] 92 %  BP: (116-147)/(73-85) 116/73     Weight: 68.8 kg (151 lb 10.8 oz)  Body mass index is 24.48 kg/m².    FHT: 150 Cat 2 (non-reassuring)  TOCO:  Q 2-3 minutes    Physical Exam:   Constitutional: She appears well-developed and well-nourished. No distress.    HENT:   Head: Normocephalic and atraumatic.    Eyes: EOM are normal.    Neck: Normal range of motion.     Pulmonary/Chest: Effort normal. No respiratory distress.        Abdominal: Soft. She exhibits no distension. There is no tenderness. There is no rebound and no guarding.   Gravid               Musculoskeletal: Normal range of motion.       Neurological: She is alert.    Skin: Skin is warm and dry.    Psychiatric: She has a normal mood and affect.       Cervix:  Dilation:  10  Effacement:  100%  Station: 2  Presentation: Vertex     Significant Labs:  Lab Results   Component Value Date    GROUPTRH O POS 10/08/2017    HEPBSAG Negative 2017    STREPBCULT No Group B Streptococcus isolated 2017       CBC:   Recent Labs  Lab 10/08/17  1022   WBC 8.89   RBC 4.83   HGB 13.7   HCT 40.1      MCV 83   MCH 28.4   MCHC 34.2     I have personallly reviewed all pertinent lab results from the last 24 hours.    Assessment/Plan:     27 y.o. female  at 39w4d for:    * 39 weeks gestation of pregnancy    Push  now  Expect  soon            Adonis Sims MD  Obstetrics  [facility]

## 2017-10-08 NOTE — HOSPITAL COURSE
Epidural   Pitocin augmentation  Complete at 1500 10/8/2017    Pushing ineffectively  Exam by me, TYLER at +2 station  Terminal decelerations audible  Vacuum placed over occiput  Traction with fundal pressure over midline episiotomy  Nuchal cord x 1  Viable Male Infant at 1551 10/8/2017  Weight: 7 lb 3.5 oz (3275 g)  Apgar: 8/9  Significant caput but no obvious vacuum marks  Pop-off x 2 with total time about a minute over three contractions  Placenta: spontaneous and intact with three vessels.  No complication  No specimen    Postpartum course was benign.  She is breast-feeding well.  Exam was benign with patient afebrile, vitals stable, and minimal bleeding.  Normal activities.  Patient discharged home on postpartum day #2, 10/10/2017   Discharge medications include Percocet, Motrin, prenatal vitamins, and iron supplement.  Follow-up with me in 6 weeks.    Adonis Sims MD.

## 2017-10-08 NOTE — SUBJECTIVE & OBJECTIVE
Obstetric HPI:  Patient reports Date/time of onset: Yes contractions, active fetal movement, Yes vaginal bleeding , Yes loss of fluid     This pregnancy has been complicated by Abnormal O'Crook    Obstetric History       T0      L0     SAB0   TAB0   Ectopic0   Multiple0   Live Births0       # Outcome Date GA Lbr Humberto/2nd Weight Sex Delivery Anes PTL Lv   1 Current                 Past Medical History:   Diagnosis Date    Migraine headache      Past Surgical History:   Procedure Laterality Date    TRACHEOSTOMY TUBE PLACEMENT         PTA Medications   Medication Sig    prenatal vits18-iron-folic-dss (INATAL ULTRA) 90-1-50 mg Tab Take 1 capsule by mouth once daily.       Review of patient's allergies indicates:  No Known Allergies     Family History     None        Social History Main Topics    Smoking status: Former Smoker     Quit date: 3/2/2014    Smokeless tobacco: Never Used    Alcohol use Yes      Comment: ocassionally    Drug use: No    Sexual activity: Yes     Partners: Male     Birth control/ protection: None     Review of Systems   Constitutional: Positive for fatigue. Negative for activity change, appetite change, fever and unexpected weight change.   Respiratory: Negative for cough, shortness of breath and wheezing.    Cardiovascular: Negative for chest pain and palpitations.   Gastrointestinal: Positive for abdominal pain and nausea. Negative for vomiting.   Endocrine: Negative for hot flashes.   Genitourinary: Positive for frequency, pelvic pain and vaginal discharge. Negative for dyspareunia, urgency, vaginal bleeding, dysmenorrhea and postcoital bleeding.   Musculoskeletal: Positive for back pain. Negative for myalgias.   Skin:  Negative for rash.   Neurological: Positive for headaches. Negative for seizures.   Psychiatric/Behavioral: Negative for depression and sleep disturbance. The patient is not nervous/anxious.    Breast: Negative for breast mass, breast pain and nipple  discharge     Objective:     Vital Signs (Most Recent):  Pulse: 76 (10/08/17 1330)  BP: 116/73 (10/08/17 1318)  SpO2: (!) 92 % (10/08/17 1330) Vital Signs (24h Range):  Pulse:  [67-94] 76  SpO2:  [88 %-99 %] 92 %  BP: (116-147)/(73-85) 116/73     Weight: 68.8 kg (151 lb 10.8 oz)  Body mass index is 24.48 kg/m².    FHT: 150 Cat 2 (non-reassuring)  TOCO:  Q 2-3 minutes    Physical Exam:   Constitutional: She appears well-developed and well-nourished. No distress.    HENT:   Head: Normocephalic and atraumatic.    Eyes: EOM are normal.    Neck: Normal range of motion.     Pulmonary/Chest: Effort normal. No respiratory distress.        Abdominal: Soft. She exhibits no distension. There is no tenderness. There is no rebound and no guarding.   Gravid               Musculoskeletal: Normal range of motion.       Neurological: She is alert.    Skin: Skin is warm and dry.    Psychiatric: She has a normal mood and affect.       Cervix:  Dilation:  10  Effacement:  100%  Station: 2  Presentation: Vertex     Significant Labs:  Lab Results   Component Value Date    GROUPTRH O POS 10/08/2017    HEPBSAG Negative 03/09/2017    STREPBCULT No Group B Streptococcus isolated 09/07/2017       CBC:   Recent Labs  Lab 10/08/17  1022   WBC 8.89   RBC 4.83   HGB 13.7   HCT 40.1      MCV 83   MCH 28.4   MCHC 34.2     I have personallly reviewed all pertinent lab results from the last 24 hours.

## 2017-10-08 NOTE — L&D DELIVERY NOTE
Delivery Information for  Geronimo Ferrara    Birth information:  YOB: 2018   Time of birth: 3:51 PM   Sex: male   Head Delivery Date/Time: 10/8/2017  3:51 PM   Delivery type: Vaginal, Vacuum (Extractor)   Gestational Age: 39w4d    Delivery Providers    Delivering clinician:  Adonis Sims MD   Other personnel:   Provider Role   Malorie Pedraza, RN    Cintia Raya, RN    Teri Vanessa V, CHAU Palomino, CHAU Casillas MD                     Assessment    Living status:  Living  Apgars:     1 Minute:   5 Minute:   10 Minute:   15 Minute:   20 Minute:     Skin Color:   0  1       Heart Rate:   2  2       Reflex Irritability:   2  2       Muscle Tone:   2  2       Respiratory Effort:   2  2       Total:   8  9                      Assisted Delivery Details:    Forceps attempted?:  No  Vacuum extractor attempted?:  Yes  Vacuum indications:  Fetal Heart Rate or Rhythm Abnormality  Vacuum type:  Kiwi  Vacuum application location:  Low  First attempt time vacuum applied:  10/8/2017 1550  Number of pop offs:  2  Number of pulls with vacuum:  3  Total vacuum application time:  1 minute  Vacuum applied by:  DR. SIMS  Failed vacuum delivery?:  No         Shoulder Dystocia    Shoulder dystocia present?:  No           Presentation and Position    Presentation:   Vertex   Position:                   Interventions/Resuscitation    Method:  None, Bulb Suctioning, Tactile Stimulation       Cord    Vessels:  3 vessels  Complications:  Nuchal  Nuchal Intervention:  reduced  Nuchal Cord Description:  loose nuchal cord  Number of Loops:  1  Delayed Cord Clamping?:  Yes  Cord Clamped Date/Time:  10/8/2017  3:51 PM  Cord Blood Disposition:  Sent with Baby  Gases Sent?:  No  Stem Cell Collection (by MD):  No       Placenta    Date and time:  10/8/2017  3:51 PM  Removal:  Spontaneous  Appearance:  Intact  Placenta disposition:  discarded           Labor Events:       labor: No     Labor Onset Date/Time:          Dilation Complete Date/Time:         Start Pushing Date/Time:       Rupture Date/Time:              Rupture type:           Fluid Amount:        Fluid Color:        Fluid Odor:        Membrane Status (PeriCalm): SRM (Spontaneous Rupture)      Rupture Date/Time (PeriCalm): 10/08/2017 11:16:00      Fluid Amount (PeriCalm): Small      Fluid Color (PeriCalm): Clear       steroids:       Antibiotics given for GBS: No     Induction:       Indications for induction:        Augmentation: oxytocin     Indications for augmentation:       Labor complications: None     Additional complications:          Cervical ripening:                     Delivery:      Episiotomy: Median     Indication for Episiotomy: Instrumented Delivery     Perineal Lacerations: None Repaired:      Periurethral Laceration: none Repaired:     Labial Laceration: none Repaired:     Sulcus Laceration: none Repaired:     Vaginal Laceration: No Repaired:     Cervical Laceration: No Repaired:     Repair suture: None     Repair # of packets:       Vaginal delivery QBL (mL):        QBL (mL): 0     Combined Blood Loss (mL): 0     Vaginal Sweep Performed: Yes     Surgicount Correct: Yes       Other providers:       Anesthesia    Method:  Epidural          Details (if applicable):  Trial of Labor      Categorization:      Priority:     Indications for :     Incision Type:       Additional  information:  Forceps:    Vacuum:    Breech:    Observed anomalies    Other (Comments):

## 2017-10-08 NOTE — LACTATION NOTE
This note was copied from a baby's chart.     10/08/17 1222   Maternal Infant Assessment   Breast Density Bilateral:;soft   Areola Bilateral:;elastic;dense   Nipple(s) Bilateral:;everted   Infant Assessment   Sucking Reflex present   Rooting Reflex present   Swallow Reflex present   LATCH Score   Latch 2-->grasps breast, tongue down, lips flanged, rhythmic sucking   Audible Swallowing 2-->spontaneous and intermittent (24 hrs old)   Type Of Nipple 2-->everted (after stimulation)   Comfort (Breast/Nipple) 2-->soft/nontender   Hold (Positioning) 1-->minimal assist, teach one side: mother does other, staff holds   Score (less than 7 for 2/more consecutive times, consult Lactation Consultant) 9   Maternal Infant Feeding   Maternal Emotional State assist needed;relaxed   Infant Positioning cradle   Signs of Milk Transfer audible swallow;infant jaw motion present   Presence of Pain no   Time Spent (min) 15-30 min   Latch Assistance yes   Breastfeeding Education adequate infant intake;adequate milk volume;importance of skin-to-skin contact   Infant First Feeding   Skin-to-Skin Contact Maintained   Breastfeeding breastfeeding, right side only   Feeding Infant   Feeding Readiness Cues rooting;hand to mouth movements   Feeding Tolerance/Success rooting;strong suck;coordinated suck;coordinated swallow   Effective Latch During Feeding yes   Audible Swallow yes   Suck/Swallow Coordination present   Lactation Referrals   Lactation Consult Breastfeeding assessment;Initial assessment   Lactation Interventions   Attachment Promotion breastfeeding assistance provided;counseling provided;infant-mother separation minimized;role responsibility promoted;skin-to-skin contact encouraged;rooming-in promoted   Latch Promotion positioning assisted;infant moved to breast   mother with baby skin to skin and baby starting to root for breast -assist to move to breast and baby latches with minimal assist when ready -strong sucking and swallows  noted -mother denies discomfort with feeding now -review some basic breastfeeding information with mother now

## 2017-10-08 NOTE — HPI
26 yo G1 at 39w4d  Admitted on 10/8/2017 in active labor with possible SROM and cervical change with regular contractions.  No vaginal bleeding  Good fetal movements.

## 2017-10-08 NOTE — ANESTHESIA PROCEDURE NOTES
Epidural    Patient location during procedure: OB   Start time: 10/8/2017 10:55 AM  Timeout: 10/8/2017 10:55 AM  End time: 10/8/2017 11:10 AM  Surgery related to: intrauterine pregnancy  Staffing  Anesthesiologist: ALBINA BARBOSA  Performed: anesthesiologist   Preanesthetic Checklist  Completed: patient identified, pre-op evaluation, timeout performed, IV checked, risks and benefits discussed, monitors and equipment checked, anesthesia consent given, hand hygiene performed and patient being monitored  Preparation  Patient position: sitting  Prep: ChloraPrep  Patient monitoring: ECG, Pulse Ox and Blood Pressure  Epidural  Skin Anesthetic: lidocaine 1%  Skin Wheal: 3 mL  Administration type: continuous  Approach: midline  Interspace: L4-5  Injection technique: SANDRA saline  Needle and Epidural Catheter  Needle type: Tuohy   Needle gauge: 17  Needle length: 3.5 inches  Needle insertion depth: 7 cm  Catheter type: end hole and springwound  Catheter size: 20 G  Catheter at skin depth: 12.5 cm  Test dose: 3 mL of lidocaine 1.5% with Epi 1-to-200,000  Additional Documentation: negative aspiration for heme and CSF, no signs/symptoms of IV or SA injection, no paresthesia on injection, no significant pain on injection and no significant complaints from patient  Needle localization: anatomical landmarks  Medications:  Bolus administered: 3 mL of 0.5% bupivacaine  Assessment  Ease of block: easy  Patient's tolerance of the procedure: comfortable throughout block and no complaints  Additional Notes  SANDRA on first pass with Tuohy with no return of CSF

## 2017-10-09 LAB
BASOPHILS # BLD AUTO: 0.01 K/UL
BASOPHILS NFR BLD: 0.1 %
DIFFERENTIAL METHOD: ABNORMAL
EOSINOPHIL # BLD AUTO: 0.1 K/UL
EOSINOPHIL NFR BLD: 0.5 %
ERYTHROCYTE [DISTWIDTH] IN BLOOD BY AUTOMATED COUNT: 14.2 %
HCT VFR BLD AUTO: 36.4 %
HGB BLD-MCNC: 12.6 G/DL
LYMPHOCYTES # BLD AUTO: 1.5 K/UL
LYMPHOCYTES NFR BLD: 12 %
MCH RBC QN AUTO: 28.6 PG
MCHC RBC AUTO-ENTMCNC: 34.6 G/DL
MCV RBC AUTO: 83 FL
MONOCYTES # BLD AUTO: 1.3 K/UL
MONOCYTES NFR BLD: 10.5 %
NEUTROPHILS # BLD AUTO: 9.8 K/UL
NEUTROPHILS NFR BLD: 76.9 %
PLATELET # BLD AUTO: 242 K/UL
PMV BLD AUTO: 10.1 FL
RBC # BLD AUTO: 4.4 M/UL
RPR SER QL: NORMAL
WBC # BLD AUTO: 12.7 K/UL

## 2017-10-09 PROCEDURE — 11000001 HC ACUTE MED/SURG PRIVATE ROOM

## 2017-10-09 PROCEDURE — 36415 COLL VENOUS BLD VENIPUNCTURE: CPT

## 2017-10-09 PROCEDURE — 85025 COMPLETE CBC W/AUTO DIFF WBC: CPT

## 2017-10-09 PROCEDURE — 99232 SBSQ HOSP IP/OBS MODERATE 35: CPT | Mod: ,,, | Performed by: OBSTETRICS & GYNECOLOGY

## 2017-10-09 PROCEDURE — 25000003 PHARM REV CODE 250: Performed by: OBSTETRICS & GYNECOLOGY

## 2017-10-09 RX ADMIN — OXYCODONE HYDROCHLORIDE AND ACETAMINOPHEN 1 TABLET: 10; 325 TABLET ORAL at 12:10

## 2017-10-09 RX ADMIN — OXYCODONE HYDROCHLORIDE AND ACETAMINOPHEN 1 TABLET: 10; 325 TABLET ORAL at 02:10

## 2017-10-09 RX ADMIN — DOCUSATE SODIUM AND SENNOSIDES 1 TABLET: 8.6; 5 TABLET, FILM COATED ORAL at 09:10

## 2017-10-09 RX ADMIN — OXYCODONE HYDROCHLORIDE AND ACETAMINOPHEN 1 TABLET: 10; 325 TABLET ORAL at 04:10

## 2017-10-09 RX ADMIN — OXYCODONE HYDROCHLORIDE AND ACETAMINOPHEN 1 TABLET: 10; 325 TABLET ORAL at 09:10

## 2017-10-09 RX ADMIN — OXYCODONE HYDROCHLORIDE AND ACETAMINOPHEN 1 TABLET: 10; 325 TABLET ORAL at 07:10

## 2017-10-09 NOTE — SUBJECTIVE & OBJECTIVE
Hospital course: Epidural   Pitocin augmentation  Complete at 1500 10/8/2017    Pushing ineffectively  Exam by TYLER moran at +2 station  Terminal decelerations audible  Vacuum placed over occiput  Traction with fundal pressure over midline episiotomy  Nuchal cord x 1  Viable Male Infant at 1551 10/8/2017  Weight: 7 lb 3.5 oz (3275 g)  Apgar: 8/9  Significant caput but no obvious vacuum marks  Pop-off x 2 with total time about a minute over three contractions  Placenta: spontaneous and intact with three vessels.  No complication  No specimen    Adonis Sims MD            Interval History:     She is doing well this morning. She is tolerating a regular diet without nausea or vomiting. She is voiding spontaneously. She is ambulating. She has passed flatus, and has not a BM. Vaginal bleeding is moderate. She denies fever or chills. Abdominal pain is mild and controlled with oral medications. She is breastfeeding. She desires circumcision for her male baby: yes.    Objective:     Vital Signs (Most Recent):  Temp: 97.4 °F (36.3 °C) (10/09/17 035)  Pulse: 74 (10/09/17 0358)  Resp: 16 (10/09/17 0358)  BP: (!) 114/55 (10/09/17 035)  SpO2: 97 % (10/08/17 1801) Vital Signs (24h Range):  Temp:  [97.4 °F (36.3 °C)-98.9 °F (37.2 °C)] 97.4 °F (36.3 °C)  Pulse:  [] 74  Resp:  [16-18] 16  SpO2:  [52 %-100 %] 97 %  BP: (114-191)/() 114/55     Weight: 67 kg (147 lb 11.2 oz)  Body mass index is 23.84 kg/m².      Intake/Output Summary (Last 24 hours) at 10/09/17 07  Last data filed at 10/09/17 0554   Gross per 24 hour   Intake             2010 ml   Output             3493 ml   Net            -1483 ml       Significant Labs:  Lab Results   Component Value Date    GROUPTRH O POS 10/08/2017    HEPBSAG Negative 2017    STREPBCULT No Group B Streptococcus isolated 2017       Recent Labs  Lab 10/09/17  0559   HGB 12.6   HCT 36.4*       CBC:   Recent Labs  Lab 10/09/17  0559   WBC 12.70   RBC 4.40   HGB 12.6   HCT 36.4*       MCV 83   MCH 28.6   MCHC 34.6     I have personallly reviewed all pertinent lab results from the last 24 hours.    Physical Exam:   Constitutional: She appears well-developed and well-nourished. No distress.    HENT:   Head: Normocephalic and atraumatic.    Eyes: EOM are normal.    Neck: Normal range of motion.    Cardiovascular: Normal rate.     Pulmonary/Chest: Effort normal. No respiratory distress.        Abdominal: Soft. She exhibits no distension. There is no tenderness. There is no rebound and no guarding.             Musculoskeletal: Normal range of motion.       Neurological: She is alert.    Skin: Skin is warm and dry.    Psychiatric: She has a normal mood and affect.

## 2017-10-09 NOTE — LACTATION NOTE
This note was copied from a baby's chart.     10/09/17 0900   Maternal Infant Assessment   Breast Density Bilateral:;filling   Areola Bilateral:;elastic   Nipple(s) Bilateral:;everted   Maternal Infant Feeding   Maternal Emotional State independent;relaxed   Presence of Pain no   Time Spent (min) 0-15 min   Latch Assistance no   Breastfeeding Education adequate infant intake;adequate milk volume;importance of skin-to-skin contact   Feeding Infant   Feeding Readiness Cues quiet  (not showing feeding cues at this time)   Lactation Referrals   Lactation Consult Follow up;Knowledge deficit   Lactation Interventions   Attachment Promotion counseling provided;privacy provided;role responsibility promoted;rooming-in promoted;skin-to-skin contact encouraged   Mother states that baby has been cluster feeding; Breasts are filling; Milk is dripping; Denies pain or discomfort with nursing; Discussed breastfeeding basics; Encouraged to feed on cue, at least 8 or more times in 24 hours; Encouraged to call for needs or assistance as needed; Verbalized understanding

## 2017-10-09 NOTE — ANESTHESIA POSTPROCEDURE EVALUATION
"Anesthesia Post Evaluation    Patient: Taryn Ferrara    Procedure(s) Performed: * No procedures listed *    Final Anesthesia Type: epidural  Patient location during evaluation: labor & delivery  Patient participation: Yes- Able to Participate  Level of consciousness: awake and alert  Post-procedure vital signs: reviewed and stable  Pain management: adequate  Airway patency: patent  PONV status at discharge: No PONV  Anesthetic complications: no      Cardiovascular status: blood pressure returned to baseline and hemodynamically stable  Respiratory status: unassisted  Hydration status: euvolemic  Follow-up not needed.        Visit Vitals  /80 (BP Location: Left arm, Patient Position: Sitting)   Pulse 78   Temp 36.3 °C (97.3 °F) (Oral)   Resp 16   Ht 5' 6" (1.676 m)   Wt 67 kg (147 lb 11.2 oz)   LMP 12/01/2016 (Within Days)   SpO2 97%   Breastfeeding? Yes   BMI 23.84 kg/m²       Pain/Ava Score: Pain Assessment Performed: Yes (10/9/2017  5:35 AM)  Presence of Pain: denies (10/9/2017  5:35 AM)  Pain Rating Prior to Med Admin: 9 (10/9/2017  7:33 AM)  Pain Rating Post Med Admin: 0 (10/9/2017  3:58 AM)      "

## 2017-10-09 NOTE — PROGRESS NOTES
[facility]  Obstetrics  Postpartum Progress Note    Patient Name: Taryn Ferrara  MRN: [id number]  Admission Date: 10/8/2017  Hospital Length of Stay: 1 days  Attending Physician: Adonis Sims MD  Primary Care Provider: Adonis Sims MD    Subjective:     Principal Problem:Status post normal delivery    Hospital course: Epidural   Pitocin augmentation  Complete at 1500 10/8/2017    Pushing ineffectively  Exam by me, TYLER at +2 station  Terminal decelerations audible  Vacuum placed over occiput  Traction with fundal pressure over midline episiotomy  Nuchal cord x 1  Viable Male Infant at 1551 10/8/2017  Weight: 7 lb 3.5 oz (3275 g)  Apgar: 8/9  Significant caput but no obvious vacuum marks  Pop-off x 2 with total time about a minute over three contractions  Placenta: spontaneous and intact with three vessels.  No complication  No specimen    Adonis Sims MD            Interval History:     She is doing well this morning. She is tolerating a regular diet without nausea or vomiting. She is voiding spontaneously. She is ambulating. She has passed flatus, and has not a BM. Vaginal bleeding is moderate. She denies fever or chills. Abdominal pain is mild and controlled with oral medications. She is breastfeeding. She desires circumcision for her male baby: yes.    Objective:     Vital Signs (Most Recent):  Temp: 97.4 °F (36.3 °C) (10/09/17 0358)  Pulse: 74 (10/09/17 0358)  Resp: 16 (10/09/17 035)  BP: (!) 114/55 (10/09/17 0358)  SpO2: 97 % (10/08/17 1801) Vital Signs (24h Range):  Temp:  [97.4 °F (36.3 °C)-98.9 °F (37.2 °C)] 97.4 °F (36.3 °C)  Pulse:  [] 74  Resp:  [16-18] 16  SpO2:  [52 %-100 %] 97 %  BP: (114-191)/() 114/55     Weight: 67 kg (147 lb 11.2 oz)  Body mass index is 23.84 kg/m².      Intake/Output Summary (Last 24 hours) at 10/09/17 0717  Last data filed at 10/09/17 0554   Gross per 24 hour   Intake              ml   Output             3493 ml   Net            -1483 ml        Significant Labs:  Lab Results   Component Value Date    GROUPTRH O POS 10/08/2017    HEPBSAG Negative 2017    STREPBCULT No Group B Streptococcus isolated 2017       Recent Labs  Lab 10/09/17  0559   HGB 12.6   HCT 36.4*       CBC:   Recent Labs  Lab 10/09/17  0559   WBC 12.70   RBC 4.40   HGB 12.6   HCT 36.4*      MCV 83   MCH 28.6   MCHC 34.6     I have personallly reviewed all pertinent lab results from the last 24 hours.    Physical Exam:   Constitutional: She appears well-developed and well-nourished. No distress.    HENT:   Head: Normocephalic and atraumatic.    Eyes: EOM are normal.    Neck: Normal range of motion.    Cardiovascular: Normal rate.     Pulmonary/Chest: Effort normal. No respiratory distress.        Abdominal: Soft. She exhibits no distension. There is no tenderness. There is no rebound and no guarding.             Musculoskeletal: Normal range of motion.       Neurological: She is alert.    Skin: Skin is warm and dry.    Psychiatric: She has a normal mood and affect.       Assessment/Plan:     27 y.o. female  for:    * Status post normal delivery    Routine postpartum care  Watch vaginal bleeding  Pain control  Lactation assistance  Discharge home tomorrow, 10/10/2017          39 weeks gestation of pregnancy    Now status post vacuum-assisted vaginal delivery  Breast-feeding well            Disposition: As patient meets milestones, will plan to discharge home.    Adonis Sims MD  Obstetrics  [facility]

## 2017-10-09 NOTE — ASSESSMENT & PLAN NOTE
Routine postpartum care  Watch vaginal bleeding  Pain control  Lactation assistance  Discharge home tomorrow, 10/10/2017

## 2017-10-09 NOTE — PLAN OF CARE
Problem: Patient Care Overview  Goal: Plan of Care Review  Outcome: Ongoing (interventions implemented as appropriate)  Plan of care reviewed. Fundus firm. Bonding with infant. Frequent checks made to ensure safety and comfort. Bed low, call bell within reach. Will continue to monitor.

## 2017-10-09 NOTE — PLAN OF CARE
Problem: Patient Care Overview  Goal: Plan of Care Review  Outcome: Ongoing (interventions implemented as appropriate)  VSS. NAD. Uterus and lochia WDL. Ambulating independently. Tolerating regular diet. Breastfeeding prn ad nolan. Pain being managed with PRN percocet. I&O today. Positive bonding with infant noted. POC reviewed with pt.

## 2017-10-10 VITALS
HEART RATE: 87 BPM | OXYGEN SATURATION: 97 % | TEMPERATURE: 98 F | WEIGHT: 147.69 LBS | DIASTOLIC BLOOD PRESSURE: 92 MMHG | BODY MASS INDEX: 23.74 KG/M2 | RESPIRATION RATE: 20 BRPM | HEIGHT: 66 IN | SYSTOLIC BLOOD PRESSURE: 134 MMHG

## 2017-10-10 PROBLEM — Z3A.39 39 WEEKS GESTATION OF PREGNANCY: Status: RESOLVED | Noted: 2017-10-08 | Resolved: 2017-10-10

## 2017-10-10 PROCEDURE — 99238 HOSP IP/OBS DSCHRG MGMT 30/<: CPT | Mod: ,,, | Performed by: OBSTETRICS & GYNECOLOGY

## 2017-10-10 PROCEDURE — 25000003 PHARM REV CODE 250: Performed by: OBSTETRICS & GYNECOLOGY

## 2017-10-10 RX ORDER — IBUPROFEN 600 MG/1
600 TABLET ORAL EVERY 6 HOURS PRN
Qty: 40 TABLET | Refills: 1 | Status: SHIPPED | OUTPATIENT
Start: 2017-10-10 | End: 2017-10-21

## 2017-10-10 RX ORDER — OXYCODONE AND ACETAMINOPHEN 5; 325 MG/1; MG/1
1 TABLET ORAL EVERY 4 HOURS PRN
Qty: 20 TABLET | Refills: 0 | Status: SHIPPED | OUTPATIENT
Start: 2017-10-10 | End: 2017-10-21

## 2017-10-10 RX ADMIN — OXYCODONE HYDROCHLORIDE AND ACETAMINOPHEN 1 TABLET: 10; 325 TABLET ORAL at 06:10

## 2017-10-10 RX ADMIN — OXYCODONE HYDROCHLORIDE AND ACETAMINOPHEN 1 TABLET: 10; 325 TABLET ORAL at 01:10

## 2017-10-10 NOTE — DISCHARGE SUMMARY
[facility]  Obstetrics  Discharge Summary      Patient Name: Taryn Ferrara  MRN: [id number]  Admission Date: 10/8/2017  Hospital Length of Stay: 2 days  Discharge Date and Time:  10/10/2017 7:28 AM  Attending Physician: Adonis Sims MD   Discharging Provider: Adonis Sims MD  Primary Care Provider: Adonis Sims MD    HPI: 26 yo G1 at 39w4d  Admitted on 10/8/2017 in active labor with possible SROM and cervical change with regular contractions.  No vaginal bleeding  Good fetal movements.      * No surgery found *     Hospital Course:   Epidural   Pitocin augmentation  Complete at 1500 10/8/2017    Pushing ineffectively  Exam by me, TYLER at +2 station  Terminal decelerations audible  Vacuum placed over occiput  Traction with fundal pressure over midline episiotomy  Nuchal cord x 1  Viable Male Infant at 1551 10/8/2017  Weight: 7 lb 3.5 oz (3275 g)  Apgar: 8/9  Significant caput but no obvious vacuum marks  Pop-off x 2 with total time about a minute over three contractions  Placenta: spontaneous and intact with three vessels.  No complication  No specimen    Postpartum course was benign.  She is breast-feeding well.  Exam was benign with patient afebrile, vitals stable, and minimal bleeding.  Normal activities.  Patient discharged home on postpartum day #2, 10/10/2017   Discharge medications include Percocet, Motrin, prenatal vitamins, and iron supplement.  Follow-up with me in 6 weeks.    Adonis Sims MD.            Consults         Status Ordering Provider     Inpatient consult to Lactation  Once     Provider:  (Not yet assigned)    ADONIS Andersen          Final Active Diagnoses:    Diagnosis Date Noted POA    PRINCIPAL PROBLEM:  Status post normal delivery [O80, Z37.0] 10/08/2017 Not Applicable      Problems Resolved During this Admission:    Diagnosis Date Noted Date Resolved POA    39 weeks gestation of pregnancy [Z3A.39] 10/08/2017 10/10/2017 Not Applicable        Labs:   CBC   Recent  Labs  Lab 10/08/17  1022 10/09/17  0559   WBC 8.89 12.70   HGB 13.7 12.6   HCT 40.1 36.4*    242    and All labs within the past 24 hours have been reviewed    Feeding Method: breast    Immunizations     Date Immunization Status Dose Route/Site Given by    10/08/17 1719 MMR Incomplete 0.5 mL Subcutaneous/Left deltoid     10/08/17 1719 Tdap Incomplete 0.5 mL Intramuscular/Left deltoid           Delivery:    Episiotomy: Median   Lacerations: None   Repair suture: None   Repair # of packets:     Blood loss (ml): 368     Birth information:  YOB: 2018   Time of birth: 3:51 PM   Sex: male   Delivery type: Vaginal, Vacuum (Extractor)   Gestational Age: 39w4d    Delivery Clinician:      Other providers:       Additional  information:  Forceps:    Vacuum:    Breech:    Observed anomalies      Living?:           APGARS  One minute Five minutes Ten minutes   Skin color:         Heart rate:         Grimace:         Muscle tone:         Breathing:         Totals: 8  9        Placenta: Delivered:       appearance    Pending Diagnostic Studies:     None          Discharged Condition: good    Disposition: Home or Self Care    Follow Up:  Follow-up Information     Adonis Sims MD In 6 weeks.    Specialties:  Obstetrics and Gynecology, Obstetrics and Gynecology  Contact information:  [address] [place], [place] [id number] [phone]                 Patient Instructions:     Diet general     Activity as tolerated     Call MD for:  temperature >100.4     Call MD for:  persistent nausea and vomiting or diarrhea     Call MD for:  severe uncontrolled pain     Call MD for:  redness, tenderness, or signs of infection (pain, swelling, redness, odor or green/yellow discharge around incision site)     Call MD for:  difficulty breathing or increased cough     Call MD for:  severe persistent headache     Call MD for:  worsening rash     Call MD for:  persistent dizziness, light-headedness, or visual disturbances      Call MD for:  increased confusion or weakness     No dressing needed       Medications:  Current Discharge Medication List      START taking these medications    Details   ibuprofen (ADVIL,MOTRIN) 600 MG tablet Take 1 tablet (600 mg total) by mouth every 6 (six) hours as needed (cramping).  Qty: 40 tablet, Refills: 1    Associated Diagnoses: Status post normal delivery      oxycodone-acetaminophen (PERCOCET) 5-325 mg per tablet Take 1 tablet by mouth every 4 (four) hours as needed.  Qty: 20 tablet, Refills: 0    Associated Diagnoses: Status post normal delivery         STOP taking these medications       prenatal vits18-iron-folic-dss (INATAL ULTRA) 90-1-50 mg Tab Comments:   Reason for Stopping:               Adonis Sims MD  Obstetrics  [facility]

## 2017-10-10 NOTE — LACTATION NOTE
10/10/17 0925   Maternal Infant Assessment   Breast Density soft   Areola elastic   Nipple(s) Bilateral:;everted   Nipple Symptoms (none)   Breast Signs/Symptoms of Infection (none)   Infant Assessment   Sucking Reflex present   Rooting Reflex present   Swallow Reflex present   Skin Color pink   Maternal Infant Feeding   Maternal Emotional State assist needed   Signs of Milk Transfer audible swallow   Engorgement Measures supportive bra encouraged   Breastfeeding Education adequate milk volume;adequate infant intake;diet;importance of skin-to-skin contact;increasing milk supply   Breastfeeding History   Currently Breastfeeding yes   Lactation Referrals   Lactation Consult Breastfeeding assessment;Follow up   Lactation Interventions   Attachment Promotion counseling provided;infant-mother separation minimized;privacy provided;role responsibility promoted;rooming-in promoted;skin-to-skin contact encouraged   Breastfeeding Assistance feeding cue recognition promoted;feeding on demand promoted;support offered   Maternal Breastfeeding Support diary/feeding log utilized;encouragement offered;infant-mother separation minimized;lactation counseling provided;maternal hydration promoted;maternal nutrition promoted;maternal rest encouraged   Patient states that baby is nursing well on the right breast but is having difficulty latching onto right breast. Discharge instructions reviewed .Advised to breastfeed on both breasts prior to giving formula supplement. patient agrees to call for assistance latching onto the right breast  when baby wakes up for next feeding. Phone number provided.

## 2017-10-10 NOTE — PLAN OF CARE
Problem: Patient Care Overview  Goal: Individualization & Mutuality  Outcome: Ongoing (interventions implemented as appropriate)  VSS. Afebrile. Good pain control with PRN Percocet. Ambulating in room and voiding. Breastfeeding well and with minimal assist with positioning. Bonding approrpiately with infant. POC discussed and understanding verbalized. MARGARET

## 2017-10-10 NOTE — ASSESSMENT & PLAN NOTE
Patient discharged home on postpartum day #2, 10/10/2017   Discharge medications include Percocet, Motrin, prenatal vitamins, and iron supplement.  Follow-up with me in 6 weeks.

## 2017-10-10 NOTE — DISCHARGE INSTRUCTIONS
After a Vaginal Birth    General Discharge Instructions  · May follow a regular diet, unless otherwise discussed with physician.  · Take showers, not baths unless otherwise discussed with physician.  · Activity as tolerated.  · No lifting or heavy exercise for 6 weeks, no driving for 2 weeks, no sexual intercourse, douching or tampons for 6 weeks  · May return to work/school as discussed with physician  · Discuss birth control with physician  · Breast care support bra worn at all times  · Lactation consultant referral number ( [phone] or [phone])    Call Your Healthcare Provider Right Away If You Have:  · A fever of 101°F or higher.  · Heavy vaginal bleeding, clots, or vaginal discharge with foul odor.  · Redness, discharge, or pain worse than you had in the hospital.  · Burning, pain, red streaks, or lumpy areas in your breasts.  · Cracks, blisters, or blood on your nipples.  · Burning or pain when you urinate.  · Persistent nausea or vomiting.  · Severe headaches, blurred vision, dizziness or fainting.  · Feelings of extreme sadness or anxiety, or a feeling that you dont want to be with your baby.  · No bowel movement for 5 days.  · Redness, warmth, swelling, or pain in the lower leg.      If You Had Stitches  You may have received stitches in the skin near your vagina. The stitches might have closed an episiotomy (an incision that enlarges the opening of the vagina). Or you may have needed stitches to repair torn skin. Either way, your stitches should dissolve within weeks. Until then, you can help reduce discomfort, aid healing, and reduce your risk of infection by keeping the stitches clean. These tips can help:  · Gently wipe from front to back after you urinate or have a bowel movement.  · After wiping, spray warm water on the area. Or you can have a sitz bath. This means sitting in a tub with a few inches of water in it. Then pat the area dry or use a hairdryer on a cool setting.  · Do not use  soap or any solution except water on the area.  · You can take a shower unless told not to.  · Change sanitary pads at least every 2-4 hours.  · Place cold or heat packs on the area as directed by your doctors or nurses. Keep a thin towel between the pack and your skin.  · Sit on firm seats so the stitches pull less.       Follow-Up  Schedule a  follow-up exam with your healthcare provider for about 6 weeks after delivery. During this exam, your uterus and vaginal area will be checked. Contact your healthcare provider if you think you or your baby are having any problems.    After a Vaginal Birth   After having a baby, your body may be very tired. It can take time to recover from a vaginal delivery. You may stay in the hospital or birth center from 1 to 4 days. In some cases, you may be able to go home the same day.       Right after the delivery   Your temperature and blood pressure will be taken until they are stable. A nurse or other healthcare provider will observe you as you rest. You may have afterbirth pains. These are cramps caused by the uterus shrinking. Sanitary pads are used to absorb the discharge of the uterine lining. To ensure that you arent bleeding too much, the pad will be checked. And the firmness of your uterus will be checked. To do this, a nurse will gently push down on your abdomen. If you had anesthesia, youll be watched closely until you can feel and move your toes. If you have perineal pain (pain between the vagina and anus), an ice pack can help.    care   While still in the hospital or birth center, youll learn how to hold and feed your baby. You will also be given instructions on how to care for your baby. This includes bathing and feeding.   Preparing to go home   You may be anxious to go home as soon as possible. Before you and your baby go home, a healthcare provider will check to be sure you are healthy enough to take care of your baby and yourself.  Youre ready to go home when:   You can walk to the bathroom without help.   You can eat solid food and swallow pills (if needed).   You have no sign of infection or other health problems, including fever.   Before leaving the hospital or birth center, youll be given written instructions for home self-care after vaginal delivery. Be sure to follow these instructions carefully. If you have questions or concerns, talk about them now.   If you had stitches   You may have received stitches in the skin near your vagina. The stitches might have closed an episiotomy (an incision that enlarges the opening of the vagina). Or you may have needed stitches to repair torn skin. Either way, your stitches should dissolve within weeks. Until then, you can help reduce discomfort, aid healing, and reduce your risk of infection by keeping the stitches clean. These tips can help:   Gently wipe from front to back after you urinate or have a bowel movement.   After wiping, spray warm water on the area. Or you can have a sitz bath. This means sitting in a tub with a few inches of water in it. Then pat the area dry or use a hairdryer on a cool setting.   Do not use soap or any solution except water on the area.   You can take a shower unless told not to.   Change sanitary pads at least every 2-4 hours.   Place cold or heat packs on the area as directed by your doctors or nurses. Keep a thin towel between the pack and your skin.   Sit on firm seats so the stitches pull less.   follow-up   Schedule a  follow-up exam with your health care provider for about 6 weeks after delivery. During this exam, your uterus and vaginal area will be checked. Contact your health care provider if you think you or your baby are having any problems.       After Delivery: When to Call the Health Care Provider   Health problems sometimes arise with you or your baby following delivery. Call your baby's health care provider or your health care  provider if you see any of the signs below.       Watch your baby for these signs   Call your babys health care provider if your baby:   Has a rectal temperature of 100.4°F (38°C) or higher   Has fewer than 6 wet diapers a day (Hint: Disposable diapers may feel heavy or hard after being soaked.)   Skin or whites of the eyes appear yellow   Cries for a long time, or if it sounds as if the cries are caused by pain   Has diarrhea   Refuses two feedings in a row   Is inactive or listless   Is vomiting   Has blood in the stool or vomit   Has a rash   Has ear drainage   Has difficulty breathing   Has a seizure   Will not wake up  Trust your instincts. If you are concerned about your baby, call your health care provider.   Watch your own health for these signs   Call your own health care provider if you have:   Burning or pain in your breasts   Red streaks or hard lumpy areas in your breasts   Problems with breast feeding   A fever of 100.4°F (38°C) or higher   Extreme tiredness or body aches, as if you have the flu   Feelings of extreme sadness or anxiety, or a feeling that you dont want to be with your baby   Abdominal pain that isnt relieved with medicine   Vaginal discharge that has a bad odor   Vaginal bleeding that soaks more than one pad per hour   If you had a  section, call for concerns about your incision site such as pain, drainage or bleeding from your incision      Incision Care After Vaginal Birth   After your babys birth, you may have needed stitches in the skin near your vagina. The stitches might have closed an episiotomy (an incision that enlarges the opening of the vagina). Or you may have needed stitches to repair torn skin. Either way, your stitches should dissolve within weeks. Until then, use this handout as a guide to help ease any discomfort and aid healing.       Keep Clean   You can reduce your risk of infection by keeping the area around the stitches clean. These hints can help:    Gently wipe from front to back after you urinate or have a bowel movement.   After wiping, spray warm water on the stitches. Pat dry. If you are too sore, just spray the area after urination and then pat dry without wiping. If you are too sore, just spray the area after urination and then pat dry without wiping.   Do not use soap or any solution except water unless instructed by your health care provider.   Change sanitary pads at least every 2-4 hours.      Eat to Stay Regular   Having bowel movements is easier if youre not constipated. Follow these tips:   Eat fresh fruit and vegetables, whole grains, and bran cereals.   Drink plenty of water.   Dont strain to have a bowel movement.   Ask your health care provider about using a stool softener. If you are breastfeeding, ask before you take any medication.      Reduce Your Discomfort   Sit in a warm bath (sitz bath).   Place cold or heat packs on your stitches. Keep a thin towel between the pack and your skin.   Sit on a firm seat so the stitches pull less.   Use medicated spray as ordered by your health care provider.  Call your doctor or health care provider if you have:   Repeated clots the size of a quarter or larger passing from the vagina   Heavy or gushing bleeding from the vagina   Discharge that has a bad odor   Severe pain in the abdomen or increased pain near your stitches   Fever or chills   No bowel movement within one week after the birth of your baby   Pain or urgency with urination, or inability to urinate        Breast Care After Birth   A few days after your babys birth, your breasts will swell with milk. They are likely to feel tender and heavy. This is normal. To help prevent breast soreness and control irritation, follow these tips:       Coping with swelling   Use cold compresses or an ice pack to help reduce the ache or pain.   Breastfeed often to keep milk from clogging your breast ducts.   If your nipples are flat from breast swelling,  hand express some milk. Squeeze out a few drops of milk by massaging and compressing your breasts.   If you have swelling with pain or fever, call your health care provider.  Preventing sore nipples   Make sure baby latches on to your breast correctly. The babys tongue should always be under your nipple, and your entire areola should be in the baby's mouth.   You can let milk dry on your nipples. This dried milk can protect the skin on your nipple/breasts.   Do not use alcohol, soap, or scented cleansers on your breasts. These can cause the nipples to dry and crack.   Do not wear nursing pads that are lined with plastic. They hold in moisture and can cause chapping.   If you experience cracked or bleeding nipples, consult your doctor or a lactation consultant. He or she will ensure that your baby's latch is correct and may suggest topical treatment, such as pure lanolin.  Choosing a good bra   Wearing the right-sized bra is especially important now. If a bra is too tight, it may cause a duct in your breast to clog and become irritated. If possible, have a salesperson help fit you for a new bra. Look for one thats 100% cotton and comfortable. Also, choose a bra with wide straps that wont dig into your back and shoulders. If youre breastfeeding, find a nursing bra that allows you to uncover one breast at a time.   If you are not breastfeeding:   Avoid stimulation of nipples   Wear a tight-fitting bra   Apply ice packs for discomfort              Breastfeeding discharge instructions given with First Alert form and reviewed.  Also discussed:   AAP recommendation of exclusive breastfeeding for the first 6 months of life and continued breastfeeding with the introduction of supplemental foods beyond the first year of life.  Instructed on the recommendation to delay all bottle and pacifier use until after 4 weeks of age and breastfeeding is well established.  Discussed the benefits of exclusive breastfeeding for both  mother and baby.  Discussed the risks of supplementation/pacifier use on the exclusivity of breastfeeding in the first 6 months. Feed the baby at the earliest sign of hunger or comfort  o Hands to mouth, sucking motions  o Rooting or searching for something to suck on  o Dont wait for crying - it is a not a late sign of hunger; it is a sign of distress     The feedings may be 8-12 times per 24hrs and will not follow a schedule   Alternate the breast you start the feeding with, or start with the breast that feels the fullest   Switch breasts when the baby takes himself off the breast or falls asleep   Keep offering breasts until the baby looks full, no longer gives hunger signs, and stays asleep when placed on his back in the crib   If the baby is sleepy and wont wake for a feeding, put the baby skin-to-skin dressed in a diaper against the mothers bare chest   Sleep near your baby   The baby should be positioned and latched on to the breast correctly  o Chest-to-chest, chin in the breast  o Babys lips are flipped outward  o Babys mouth is stretched open wide like a shout  o Babys sucking should feel like tugging to the mother  - The baby should be drinking at the breast:  o You should hear swallowing or gulping throughout the feeding  o You should see milk on the babys lips when he comes off the breast  o Your breasts should be softer when the baby is finished feeding  o The baby should look relaxed at the end of feedings  o After the 4th day and your milk is in:  o The babys poop should turn bright yellow and be loose, watery, and seedy  o The baby should have at least 3-4 poops the size of the palm of your hand per day  o The baby should have at least 6-8 wet diapers per day  o The urine should be light yellow in color  You should drink when you are thirsty and eat a healthy diet when you are    hungry.     Take naps to get the rest you need.   Take medications and/or drink alcohol only with  permission of your obstetrician    or the babys pediatrician.  You can also call the [facility],   ([phone]), Monday-Friday, 8am-5pm Central time, to get the most   up-to-date evidence-based information on the use of medications during   pregnancy and breastfeeding.      The baby should be examined by a pediatrician at 3-5 days of age; unless ordered sooner by the pediatrician.  Once your milk comes in, the baby should be back to birth weight no later than 10-14 days of age.

## 2017-10-11 ENCOUNTER — TELEPHONE (OUTPATIENT)
Dept: OBSTETRICS AND GYNECOLOGY | Facility: CLINIC | Age: 27
End: 2017-10-11

## 2017-10-11 NOTE — TELEPHONE ENCOUNTER
----- Message from Martir Barber sent at 10/11/2017  8:43 AM CDT -----  Contact: self  Pt called requesting a breast pump. Contact her and advise at [phone].    Thanks-

## 2017-10-12 ENCOUNTER — TELEPHONE (OUTPATIENT)
Dept: OBSTETRICS AND GYNECOLOGY | Facility: HOSPITAL | Age: 27
End: 2017-10-12

## 2017-10-12 NOTE — TELEPHONE ENCOUNTER
Pt was advised to go to [facility] for the breast pump.  Pt stated that she did go there and the [facility] office requires for pt to breast feed for one month before she is qualified for a breast pump.  With that requirement, pt was unable to wait that long so she went to purchase her own breast pump.  Pt went ahead and scheduled an appt for her 6 weeks pp. sophia

## 2017-10-13 ENCOUNTER — TELEPHONE (OUTPATIENT)
Dept: OBSTETRICS AND GYNECOLOGY | Facility: HOSPITAL | Age: 27
End: 2017-10-13

## 2017-10-13 NOTE — TELEPHONE ENCOUNTER
"Follow up lactation phone call with Ms. Ferrara; States baby eats "all the time, about 15 x a day". Is supplementing with Enfamil due to jaundice per md order; Gives about 30ml enfamil after breastfeeding; Baby is having adequate output; Mother denies pain or discomfort with nursing; Has a follow up ped appointment for the jaundice on Tuesday; Will follow up; Denies any further needs or concerns at this time  "

## 2017-10-17 ENCOUNTER — TELEPHONE (OUTPATIENT)
Dept: OBSTETRICS AND GYNECOLOGY | Facility: HOSPITAL | Age: 27
End: 2017-10-17

## 2017-10-17 NOTE — TELEPHONE ENCOUNTER
"Lactation Telephone Follow up:  Spoke with pt.  States that she quit breastfeeding due to nipple pain and soreness.  States that breasts are now full and heavy.  Discussed ways to relieve nipple pain/soreness and position and latch.  States that she has not  since yesterday and does not plan to resume.  Instructed on non-nursing engorgement management.  Denies any other c/o or concerns.  Hotline # given.  States "understand" and verbalized appropriate recall.  "

## 2017-10-21 ENCOUNTER — HOSPITAL ENCOUNTER (EMERGENCY)
Facility: HOSPITAL | Age: 27
Discharge: HOME | End: 2017-10-21
Attending: EMERGENCY MEDICINE
Payer: MEDICAID

## 2017-10-21 VITALS
BODY MASS INDEX: 22.82 KG/M2 | OXYGEN SATURATION: 98 % | SYSTOLIC BLOOD PRESSURE: 128 MMHG | HEART RATE: 73 BPM | WEIGHT: 142 LBS | TEMPERATURE: 98 F | DIASTOLIC BLOOD PRESSURE: 86 MMHG | HEIGHT: 66 IN | RESPIRATION RATE: 18 BRPM

## 2017-10-21 DIAGNOSIS — L98.9 SKIN LESION OF BACK: Primary | ICD-10-CM

## 2017-10-21 PROCEDURE — 99283 EMERGENCY DEPT VISIT LOW MDM: CPT

## 2017-10-21 PROCEDURE — 25000003 PHARM REV CODE 250: Performed by: PHYSICIAN ASSISTANT

## 2017-10-21 RX ORDER — LIDOCAINE HYDROCHLORIDE 10 MG/ML
5 INJECTION INFILTRATION; PERINEURAL
Status: COMPLETED | OUTPATIENT
Start: 2017-10-21 | End: 2017-10-21

## 2017-10-21 RX ADMIN — LIDOCAINE HYDROCHLORIDE 5 ML: 10 INJECTION, SOLUTION INFILTRATION; PERINEURAL at 07:10

## 2017-10-22 NOTE — ED PROVIDER NOTES
"Encounter Date: 10/21/2017    SCRIBE #1 NOTE: I, Juvencio Umanzor, am scribing for, and in the presence of,  Yadira Nichols PA-C. I have scribed the following portions of the note - Other sections scribed: HPI and ROS.       History     Chief Complaint   Patient presents with    Abscess     " I have had an abscess under my right arm for years, it got bigger over night and it bust."      CC: Abscess    HPI: This 27 y.o. F with no pertinent PMHx presents to ED for evaluation of an aggravated skin growth that she refers to as an "abscess." Pt reports that she has had the growth for 10 yrs but that it grew overnight, causing it to "bust" open. She woke up to blood and pus in the sheets. No further complaints. She denies fever, n/v, SOB, and abdominal pain. No known medical allergies.       The history is provided by the patient. No  was used.     Review of patient's allergies indicates:  No Known Allergies  Past Medical History:   Diagnosis Date    Migraine headache      Past Surgical History:   Procedure Laterality Date    TRACHEOSTOMY TUBE PLACEMENT       Family History   Problem Relation Age of Onset    Diabetes Neg Hx     Hypertension Neg Hx      Social History   Substance Use Topics    Smoking status: Former Smoker     Quit date: 3/2/2014    Smokeless tobacco: Never Used    Alcohol use Yes      Comment: ocassionally     Review of Systems   Constitutional: Negative for diaphoresis and fever.   HENT: Negative for sore throat.    Eyes: Negative for visual disturbance.   Respiratory: Negative for cough and shortness of breath.    Cardiovascular: Negative for chest pain.   Gastrointestinal: Negative for abdominal pain, diarrhea, nausea and vomiting.   Genitourinary: Negative for dysuria.   Musculoskeletal: Negative for back pain.   Skin: Positive for wound (growth on right upper back). Negative for rash.   Neurological: Negative for weakness and headaches.   Hematological: Does not " bruise/bleed easily.   Psychiatric/Behavioral: Negative for confusion.       Physical Exam     Initial Vitals [10/21/17 1839]   BP Pulse Resp Temp SpO2   128/86 73 18 98 °F (36.7 °C) 98 %      MAP       100         Physical Exam    Nursing note and vitals reviewed.  Constitutional: Vital signs are normal. She appears well-developed and well-nourished. She is not diaphoretic. She is cooperative. No distress.   HENT:   Head: Normocephalic and atraumatic.   Right Ear: Tympanic membrane, external ear and ear canal normal. No hemotympanum.   Left Ear: Tympanic membrane, external ear and ear canal normal. No hemotympanum.   Nose: Nose normal.   Mouth/Throat: Oropharynx is clear and moist.   Eyes: Conjunctivae, EOM and lids are normal. Pupils are equal, round, and reactive to light.   Neck: Trachea normal, normal range of motion, full passive range of motion without pain and phonation normal. Neck supple.   Cardiovascular: Normal rate, regular rhythm, normal heart sounds and normal pulses.   Pulmonary/Chest: Effort normal and breath sounds normal. No respiratory distress.   Abdominal: Soft. Normal appearance and bowel sounds are normal. There is no tenderness.   Musculoskeletal: Normal range of motion.   Neurological: She is alert and oriented to person, place, and time. She has normal strength. No cranial nerve deficit or sensory deficit. GCS eye subscore is 4. GCS verbal subscore is 5. GCS motor subscore is 6.   Skin: Skin is warm and dry. Capillary refill takes less than 2 seconds. Lesion noted. No rash and no abscess noted. No erythema.        Psychiatric: She has a normal mood and affect. Her behavior is normal. Judgment and thought content normal.         ED Course   Procedures  Labs Reviewed - No data to display          Medical Decision Making:   Initial Assessment:   This is an evaluation of a 27 y.o. female that presents to the Emergency Department for abscess.     Physical Exam shows a non-toxic, afebrile, and  well appearing female.  There is a pedunculated, flesh-colored growth on the right scapular region.  There is a skin tear superiorly.  There is no sign of cellulitis.  There is no discharge.  Lungs clear to auscultation bilaterally.  Regular rate and rhythm, no murmurs.  Abdomen soft and nontender to palpation.  The remainder of the physical exam is unrevealing.    Vital Signs Are Reassuring. If available, previous records reviewed.       My overall impression is Irritated Skin lesion. I considered, but at this time, do not suspect abscess, cellulitis, tumor.  I anesthetized the area with 3 mL of Lidocaine 1% without Epi and placed a surgical not with silk suture to the base of the lesion.  A dressing was applied. Patient tolerated the procedure well with no immediate problems.    ED Course: Lidocaine, Suture tie.  Additional D/C Information: Wound care instructions given.  The patient was instructed that the lesion should fall off within the next week.  I explained that there may be some discomfort and color change to the area.  I instructed her to take ibuprofen, naproxen or Tylenol for the discomfort as needed.  I discussed that if the lesion does not fall off, that the patient should follow-up with her dermatologist.  She expresses understanding.  The diagnosis, treatment plan, instructions for follow-up and reevaluation with a Dermatologist, as well as ED return precautions were discussed and understanding was verbalized. All questions or concerns have been addressed. Patient was discharged home with an instructional sheet which gave not only information regarding the most likely diagnoses but also information regarding when to return to the emergency department for alarming symptoms and when to seek further care.      This case was discussed with and the patient has been examined by Dr. Faust who is in agreement with my assessment and plan.     Yadira Cooper PA-C                Scribe Attestation:    Scribe #1: I performed the above scribed service and the documentation accurately describes the services I performed. I attest to the accuracy of the note.    Attending Attestation:           Physician Attestation for Scribe:  Physician Attestation Statement for Scribe #1: I, Yadira Ramirez PA-C, reviewed documentation, as scribed by Juvencio Umanzor in my presence, and it is both accurate and complete.                 ED Course      Clinical Impression:   The encounter diagnosis was Skin lesion of back.    Disposition:   Disposition: Discharged  Condition: Stable                        Yadira Nichols PA-C  10/21/17 2026

## 2017-11-21 ENCOUNTER — POSTPARTUM VISIT (OUTPATIENT)
Dept: OBSTETRICS AND GYNECOLOGY | Facility: CLINIC | Age: 27
End: 2017-11-21
Payer: MEDICAID

## 2017-11-21 ENCOUNTER — CLINICAL SUPPORT (OUTPATIENT)
Dept: OBSTETRICS AND GYNECOLOGY | Facility: CLINIC | Age: 27
End: 2017-11-21
Payer: MEDICAID

## 2017-11-21 VITALS
SYSTOLIC BLOOD PRESSURE: 110 MMHG | HEIGHT: 66 IN | BODY MASS INDEX: 21.93 KG/M2 | TEMPERATURE: 99 F | DIASTOLIC BLOOD PRESSURE: 64 MMHG | WEIGHT: 136.44 LBS

## 2017-11-21 VITALS
WEIGHT: 136.44 LBS | BODY MASS INDEX: 22.03 KG/M2 | SYSTOLIC BLOOD PRESSURE: 110 MMHG | DIASTOLIC BLOOD PRESSURE: 64 MMHG

## 2017-11-21 DIAGNOSIS — Z30.42 ENCOUNTER FOR MANAGEMENT AND INJECTION OF DEPO-PROVERA: Primary | ICD-10-CM

## 2017-11-21 DIAGNOSIS — Z30.09 FAMILY PLANNING: ICD-10-CM

## 2017-11-21 PROCEDURE — 99999 PR PBB SHADOW E&M-EST. PATIENT-LVL III: CPT | Mod: PBBFAC,,, | Performed by: OBSTETRICS & GYNECOLOGY

## 2017-11-21 PROCEDURE — 99212 OFFICE O/P EST SF 10 MIN: CPT | Mod: PBBFAC,27

## 2017-11-21 PROCEDURE — 99999 PR PBB SHADOW E&M-EST. PATIENT-LVL II: CPT | Mod: PBBFAC,,,

## 2017-11-21 PROCEDURE — 99213 OFFICE O/P EST LOW 20 MIN: CPT | Mod: PBBFAC | Performed by: OBSTETRICS & GYNECOLOGY

## 2017-11-21 PROCEDURE — 96372 THER/PROPH/DIAG INJ SC/IM: CPT | Mod: PBBFAC

## 2017-11-21 RX ORDER — MEDROXYPROGESTERONE ACETATE 150 MG/ML
150 INJECTION, SUSPENSION INTRAMUSCULAR
Status: DISCONTINUED | OUTPATIENT
Start: 2017-11-21 | End: 2018-02-21 | Stop reason: CLARIF

## 2017-11-21 RX ADMIN — MEDROXYPROGESTERONE ACETATE 150 MG: 150 INJECTION, SUSPENSION INTRAMUSCULAR at 02:11

## 2017-11-21 NOTE — PROGRESS NOTES
Subjective:       Patient ID: Taryn Ferrara is a 27 y.o. female.    Chief Complaint:  Postpartum Care (6 wks pp for  on 10/08/17)      History of Present Illness  HPI  Ms Ferrara is a 27 years old, status post vaginal delivery on 10/8/2017.  She comes in today for a postpartum exam and followup.  Patient has no current complaints.  No fever or chills.  No nausea or vomiting.  No diarrhea or constipation.  No abdominal or pelvic pain.    She has NOT resumed normal intercourse.  Patient has begun some walking for exercise. She denies having signs and symptoms of significant postpartum depression.  She is bottle-feeding well.        GYN & OB History  Patient's last menstrual period was 2016 (within days).   Date of Last Pap: No result found    OB History    Para Term  AB Living   1 1 1     1   SAB TAB Ectopic Multiple Live Births         0 1      # Outcome Date GA Lbr Humberto/2nd Weight Sex Delivery Anes PTL Lv   1 Term 10/08/17 39w4d  3.275 kg (7 lb 3.5 oz) M Vag-Vacuum EPI N GASTON        Past Medical History:   Diagnosis Date    Migraine headache        Past Surgical History:   Procedure Laterality Date    TRACHEOSTOMY TUBE PLACEMENT         Family History   Problem Relation Age of Onset    Diabetes Neg Hx     Hypertension Neg Hx        Social History     Social History    Marital status: Single     Spouse name: N/A    Number of children: N/A    Years of education: N/A     Social History Main Topics    Smoking status: Former Smoker     Quit date: 3/2/2014    Smokeless tobacco: Never Used    Alcohol use Yes      Comment: ocassionally    Drug use: No    Sexual activity: Yes     Partners: Male     Birth control/ protection: None     Other Topics Concern    None     Social History Narrative    Together since early     He is in school.  [employer].  Working delivering fast food    She is at [employer].  Also a security at [employer]       No current outpatient prescriptions  on file.     Current Facility-Administered Medications   Medication Dose Route Frequency Provider Last Rate Last Dose    medroxyPROGESTERone (DEPO-PROVERA) syringe 150 mg  150 mg Intramuscular Q90 Days Adonis Sims MD           Review of patient's allergies indicates:  No Known Allergies    Review of Systems  Review of Systems   Constitutional: Positive for fatigue. Negative for activity change, appetite change, chills, fever and unexpected weight change.   HENT: Negative for mouth sores.    Respiratory: Negative for cough, shortness of breath and wheezing.    Cardiovascular: Negative for chest pain and palpitations.   Gastrointestinal: Negative for abdominal pain, bloating, blood in stool, constipation, nausea and vomiting.   Endocrine: Negative for diabetes and hot flashes.   Genitourinary: Negative for dyspareunia, dysuria, frequency, hematuria, menorrhagia, menstrual problem, pelvic pain, urgency, vaginal bleeding, vaginal discharge, vaginal pain, dysmenorrhea, urinary incontinence, postcoital bleeding and vaginal odor.   Musculoskeletal: Negative for back pain and myalgias.   Skin:  Negative for rash.   Neurological: Negative for seizures and headaches.   Psychiatric/Behavioral: Negative for depression and sleep disturbance. The patient is not nervous/anxious.    Breast: Negative for breast mass, breast pain and nipple discharge          Objective:    Physical Exam:   Constitutional: She appears well-developed and well-nourished. No distress.    HENT:   Head: Normocephalic and atraumatic.    Eyes: EOM are normal.    Neck: Normal range of motion.     Pulmonary/Chest: Effort normal. No respiratory distress.   Breasts: Non-tender, no engorgement, no masses, no retraction, no discharge. Negative for lymphadenopathy.         Abdominal: Soft. She exhibits no distension. There is no tenderness. There is no rebound and no guarding.     Genitourinary: Vagina normal and uterus normal. No vaginal discharge found.    Genitourinary Comments: Vulva without any obvious lesions.  Vaginal vault with good support.  Minimal yellow discharge noted.  No obvious lesion.  Cervix is without any cervical motion tenderness.  No obvious lesion.  Uterus is small, non-tender, normal contour.  Adnexa is without any masses or tenderness.           Musculoskeletal: Normal range of motion.       Neurological: She is alert.    Skin: Skin is warm and dry.    Psychiatric: She has a normal mood and affect.          Assessment:        1. Postpartum care and examination immediately after delivery    2. Family planning             Plan:      I have discussed with the patient regarding her condition  She has recovered well from her delivery    I have also discussed with the patient regarding her contraceptive options.  Risks and benefits of all discussed including oral contraceptives, Depo-Provera, OrthoEvra, NuvaRing, Mirena/ParaGard, Implanon, sterilization. After extensive dicussion, the patient wishes to have Depo Provera.  Risks and benefits again discussed.  All of her questions were answered appropriately to her satisfaction.     The injection given today    Back in 3 months for Depo Provera and Pap    OK to return to work as early as tomorrow, 11/22/2017

## 2017-11-21 NOTE — PROGRESS NOTES
REVIEWED WITH PT DEPO PROVERA,  HAND OUT GIVEN TO PT ABOUT DEPO PROVERA, REVIEWED MEDICATION DOCUMENTATION TO CONFIRM CORRECT MEDICATION, INJECTION GIVEN VIA              W/O INCIDENT, NEXT APPT MADE FOR        @

## 2018-01-25 ENCOUNTER — TELEPHONE (OUTPATIENT)
Dept: OBSTETRICS AND GYNECOLOGY | Facility: CLINIC | Age: 28
End: 2018-01-25

## 2018-01-25 NOTE — TELEPHONE ENCOUNTER
----- Message from Rene Thomas sent at 1/25/2018  1:37 PM CST -----  Contact: Mom  Pt's mom has questions regarding pt's depo schedule. Pt can be reached @ [phone].  ------------------------------------------------------------------------  1/25/18 @ 1446 ([facility])  CALL ATTEMPT TO PT UNSUCCESSFUL , MESSAGE LEFT FOR PT TO RETURN CALL TO OFFICE CONCERNING PT'S DEPO SCHEDULE

## 2018-02-21 ENCOUNTER — CLINICAL SUPPORT (OUTPATIENT)
Dept: OBSTETRICS AND GYNECOLOGY | Facility: CLINIC | Age: 28
End: 2018-02-21
Payer: MEDICAID

## 2018-02-21 ENCOUNTER — OFFICE VISIT (OUTPATIENT)
Dept: OBSTETRICS AND GYNECOLOGY | Facility: CLINIC | Age: 28
End: 2018-02-21
Payer: MEDICAID

## 2018-02-21 VITALS
DIASTOLIC BLOOD PRESSURE: 66 MMHG | SYSTOLIC BLOOD PRESSURE: 122 MMHG | BODY MASS INDEX: 21.4 KG/M2 | WEIGHT: 133.19 LBS | HEIGHT: 66 IN

## 2018-02-21 VITALS
SYSTOLIC BLOOD PRESSURE: 122 MMHG | DIASTOLIC BLOOD PRESSURE: 66 MMHG | BODY MASS INDEX: 21.49 KG/M2 | WEIGHT: 133.19 LBS

## 2018-02-21 DIAGNOSIS — Z30.09 FAMILY PLANNING: ICD-10-CM

## 2018-02-21 DIAGNOSIS — Z01.419 WELL WOMAN EXAM WITH ROUTINE GYNECOLOGICAL EXAM: Primary | ICD-10-CM

## 2018-02-21 DIAGNOSIS — Z30.42 ENCOUNTER FOR MANAGEMENT AND INJECTION OF DEPO-PROVERA: Primary | ICD-10-CM

## 2018-02-21 PROCEDURE — 96372 THER/PROPH/DIAG INJ SC/IM: CPT | Mod: PBBFAC

## 2018-02-21 PROCEDURE — 99212 OFFICE O/P EST SF 10 MIN: CPT | Mod: PBBFAC,27,25

## 2018-02-21 PROCEDURE — 99213 OFFICE O/P EST LOW 20 MIN: CPT | Mod: PBBFAC | Performed by: OBSTETRICS & GYNECOLOGY

## 2018-02-21 PROCEDURE — 99999 PR PBB SHADOW E&M-EST. PATIENT-LVL III: CPT | Mod: PBBFAC,,, | Performed by: OBSTETRICS & GYNECOLOGY

## 2018-02-21 PROCEDURE — 99395 PREV VISIT EST AGE 18-39: CPT | Mod: S$PBB,,, | Performed by: OBSTETRICS & GYNECOLOGY

## 2018-02-21 PROCEDURE — 99999 PR PBB SHADOW E&M-EST. PATIENT-LVL II: CPT | Mod: PBBFAC,,,

## 2018-02-21 PROCEDURE — 88175 CYTOPATH C/V AUTO FLUID REDO: CPT

## 2018-02-21 RX ORDER — MEDROXYPROGESTERONE ACETATE 150 MG/ML
150 INJECTION, SUSPENSION INTRAMUSCULAR
Status: SHIPPED | OUTPATIENT
Start: 2018-02-21 | End: 2019-08-15

## 2018-02-21 RX ADMIN — MEDROXYPROGESTERONE ACETATE 150 MG: 150 INJECTION, SUSPENSION INTRAMUSCULAR at 02:02

## 2018-02-21 NOTE — PROGRESS NOTES
REVIEWED WITH PT DEPO PROVERA, MEDICATION DOCUMENTATION TO CONFIRM CORRECT MEDICATION, INJECTION GIVEN VIA    R GLUTEALL W/O INCIDENT, NEXT APPT MADE FOR   5/22/18     @ 1300

## 2018-02-21 NOTE — PROGRESS NOTES
Subjective:       Patient ID: Taryn Ferrara is a 27 y.o. female.    Chief Complaint:  Gynecologic Exam (Pt here for annual)      History of Present Illness  HPI  Annual Exam-Premenopausal  Patient presents for annual exam. The patient has no complaints today. The patient is sexually active. GYN screening history: no prior history of gyn screening tests. The patient wears seatbelts: yes. The patient participates in regular exercise: yes. Has the patient ever been transfused or tattooed?: yes. The patient reports that there is not domestic violence in her life.    Has been doing well on Depo Provera since the delivery of her child.      GYN & OB History  No LMP recorded. Patient has had an injection.   Date of Last Pap: No result found    OB History    Para Term  AB Living   1 1 1     1   SAB TAB Ectopic Multiple Live Births         0 1      # Outcome Date GA Lbr Humberto/2nd Weight Sex Delivery Anes PTL Lv   1 Term 10/08/17 39w4d  3.275 kg (7 lb 3.5 oz) M Vag-Vacuum EPI N GASTON        Past Medical History:   Diagnosis Date    Migraine headache        Past Surgical History:   Procedure Laterality Date    TRACHEOSTOMY TUBE PLACEMENT         Family History   Problem Relation Age of Onset    Diabetes Neg Hx     Hypertension Neg Hx        Social History     Social History    Marital status: Single     Spouse name: N/A    Number of children: N/A    Years of education: N/A     Social History Main Topics    Smoking status: Former Smoker     Quit date: 3/2/2014    Smokeless tobacco: Never Used    Alcohol use Yes      Comment: ocassionally    Drug use: No    Sexual activity: Yes     Partners: Male     Birth control/ protection: None     Other Topics Concern    None     Social History Narrative    Together since early     He is in school.  [employer].  Working delivering fast food    She is at [employer].  Also a security at [employer]       No current outpatient prescriptions on file.      Current Facility-Administered Medications   Medication Dose Route Frequency Provider Last Rate Last Dose    medroxyPROGESTERone (DEPO-PROVERA) syringe 150 mg  150 mg Intramuscular Q90 Days Adonis Sims MD   150 mg at 11/21/17 1444       Review of patient's allergies indicates:  No Known Allergies    Review of Systems  Review of Systems   Constitutional: Negative for activity change, appetite change, chills, fatigue, fever and unexpected weight change.   HENT: Negative for mouth sores.    Respiratory: Negative for cough, shortness of breath and wheezing.    Cardiovascular: Negative for chest pain and palpitations.   Gastrointestinal: Negative for abdominal pain, bloating, blood in stool, constipation, nausea and vomiting.   Endocrine: Negative for diabetes and hot flashes.   Genitourinary: Negative for dyspareunia, dysuria, frequency, hematuria, menorrhagia, menstrual problem, pelvic pain, urgency, vaginal bleeding, vaginal discharge, vaginal pain, dysmenorrhea, urinary incontinence, postcoital bleeding and vaginal odor.   Musculoskeletal: Negative for back pain and myalgias.   Skin:  Negative for rash.   Neurological: Negative for seizures and headaches.   Psychiatric/Behavioral: Negative for depression and sleep disturbance. The patient is not nervous/anxious.    Breast: Negative for breast mass, breast pain and nipple discharge          Objective:    Physical Exam:   Constitutional: She appears well-developed and well-nourished. No distress.    HENT:   Head: Normocephalic and atraumatic.    Eyes: EOM are normal.    Neck: Normal range of motion.     Pulmonary/Chest: Effort normal. No respiratory distress.   Breasts: Non-tender, no engorgement, no masses, no retraction, no discharge. Negative for lymphadenopathy.         Abdominal: Soft. She exhibits no distension. There is no tenderness. There is no rebound and no guarding.     Genitourinary: Vagina normal and uterus normal. No vaginal discharge found.    Genitourinary Comments: Vulva without any obvious lesions.  Vaginal vault with good support.  Minimal white discharge noted.  No obvious lesion.  Cervix is without any cervical motion tenderness.  No obvious lesion.  Uterus is small, non-tender, normal contour.  Adnexa is without any masses or tenderness.           Musculoskeletal: Normal range of motion.       Neurological: She is alert.    Skin: Skin is warm and dry.    Psychiatric: She has a normal mood and affect.          Assessment:        1. Well woman exam with routine gynecological exam    2.   Family planning         Plan:          I have discussed with the patient her condition.  Monthly breast examination was instructed, discussed, and encouraged.  Patient was encouraged to consume a low-calorie, low fat diet, and to increase of physical activity.  Healthy habits encouraged.  A Pap smear was  performed.  Mammogram was not ordered because of the combination of her age and risk factors.  Gonorrhea and Chlamydia testing not performed.  HIV test not ordered.  Patient is to continue her medication, Depo Provera.  She will come back to see me in one year for her annual visit.  She can come back to see me sooner as necessary.  All of her questions were answered appropriately to her satisfaction.    Depo Provera 150 mg IM today  Back in 3 months for another injection

## 2018-02-26 ENCOUNTER — TELEPHONE (OUTPATIENT)
Dept: OBSTETRICS AND GYNECOLOGY | Facility: CLINIC | Age: 28
End: 2018-02-26

## 2018-02-26 DIAGNOSIS — A59.01 TRICHOMONAS VAGINITIS: Primary | ICD-10-CM

## 2018-02-26 RX ORDER — METRONIDAZOLE 500 MG/1
500 TABLET ORAL EVERY 12 HOURS
Qty: 14 TABLET | Refills: 0 | Status: SHIPPED | OUTPATIENT
Start: 2018-02-26 | End: 2018-03-12

## 2018-02-28 NOTE — TELEPHONE ENCOUNTER
EB for pt to call back to make sure she has picked up medication at her pharmacy for treatment of Trichomonais found in the pap report. sophia

## 2018-05-23 ENCOUNTER — CLINICAL SUPPORT (OUTPATIENT)
Dept: OBSTETRICS AND GYNECOLOGY | Facility: CLINIC | Age: 28
End: 2018-05-23
Payer: MEDICAID

## 2018-05-23 VITALS — SYSTOLIC BLOOD PRESSURE: 126 MMHG | BODY MASS INDEX: 20.18 KG/M2 | WEIGHT: 125 LBS | DIASTOLIC BLOOD PRESSURE: 74 MMHG

## 2018-05-23 DIAGNOSIS — Z30.42 ENCOUNTER FOR MANAGEMENT AND INJECTION OF DEPO-PROVERA: Primary | ICD-10-CM

## 2018-05-23 PROCEDURE — 99212 OFFICE O/P EST SF 10 MIN: CPT | Mod: PBBFAC

## 2018-05-23 PROCEDURE — 99999 PR PBB SHADOW E&M-EST. PATIENT-LVL II: CPT | Mod: PBBFAC,,,

## 2018-05-23 PROCEDURE — 96372 THER/PROPH/DIAG INJ SC/IM: CPT | Mod: PBBFAC

## 2018-05-23 RX ADMIN — MEDROXYPROGESTERONE ACETATE 150 MG: 150 INJECTION, SUSPENSION INTRAMUSCULAR at 02:05

## 2018-05-23 NOTE — PROGRESS NOTES
REVIEWED WITH PT DEPO PROVERA,  HAND OUT GIVEN TO PT ABOUT DEPO PROVERA, REVIEWED MEDICATION DOCUMENTATION TO CONFIRM CORRECT MEDICATION, INJECTION GIVEN VIA L  GLUTEAL  W/O INCIDENT, NEXT APPT MADE FOR  8/17/18    @ 6966

## 2018-09-10 ENCOUNTER — TELEPHONE (OUTPATIENT)
Dept: OBSTETRICS AND GYNECOLOGY | Facility: CLINIC | Age: 28
End: 2018-09-10

## 2018-09-10 NOTE — TELEPHONE ENCOUNTER
----- Message from Susan Hampton sent at 9/10/2018  1:14 PM CDT -----  Contact: Self/[phone]  Patient would like to schedule her Depo injection. Thank you.  ----------------------------------------------------------  9/10/18 @ 8104 ([facility])  CALL ATTEMPT TO PT UNSUCCESSFUL , MESSAGE LEFT FOR PT TO RETURN CALL TO OFFICE CONCERNING HER NEEDING TO MAKE APPT WITH DR MONTGOMERY TO GET CLEARANCE FOR A DEPO INJECTION DUE TO HER BEING PAST HER JOCELYN PERIOD  BY 2 MONTHS

## 2018-12-17 ENCOUNTER — TELEPHONE (OUTPATIENT)
Dept: OBSTETRICS AND GYNECOLOGY | Facility: CLINIC | Age: 28
End: 2018-12-17

## 2018-12-17 NOTE — TELEPHONE ENCOUNTER
12/17/18 @ 1158 (brian)  SPOKE WITH MS LINDA, INFORMED HER THAT DR GOMEZ CAN SEE HER ON 12/18/18 @ 8455  FOR DEPO CLEARANCE , APPT MADE    ---- Message from Susan Hampton sent at 12/17/2018  9:23 AM CST -----  Contact: Self/[phone]  Patient would like to schedule her Depo injection. Thank you.

## 2018-12-18 ENCOUNTER — CLINICAL SUPPORT (OUTPATIENT)
Dept: OBSTETRICS AND GYNECOLOGY | Facility: CLINIC | Age: 28
End: 2018-12-18
Payer: MEDICAID

## 2018-12-18 VITALS — BODY MASS INDEX: 18.5 KG/M2 | WEIGHT: 114.63 LBS

## 2018-12-18 DIAGNOSIS — Z30.42 ENCOUNTER FOR MANAGEMENT AND INJECTION OF DEPO-PROVERA: Primary | ICD-10-CM

## 2018-12-18 PROCEDURE — 96372 THER/PROPH/DIAG INJ SC/IM: CPT | Mod: PBBFAC

## 2018-12-18 PROCEDURE — 99212 OFFICE O/P EST SF 10 MIN: CPT | Mod: PBBFAC,25

## 2018-12-18 PROCEDURE — 99999 PR PBB SHADOW E&M-EST. PATIENT-LVL II: CPT | Mod: PBBFAC,,,

## 2018-12-18 RX ADMIN — MEDROXYPROGESTERONE ACETATE 150 MG: 150 INJECTION, SUSPENSION INTRAMUSCULAR at 09:12

## 2018-12-18 NOTE — PROGRESS NOTES
REVIEWED WITH PT DEPO PROVERA,  HAND OUT GIVEN TO PT ABOUT DEPO PROVERA, REVIEWED MEDICATION DOCUMENTATION TO CONFIRM CORRECT MEDICATION, INJECTION GIVEN VIA   R GLUTEAL  W/O INCIDENT, NEXT APPT MADE FOR  3/19/19      @ 6157

## 2019-03-19 ENCOUNTER — CLINICAL SUPPORT (OUTPATIENT)
Dept: OBSTETRICS AND GYNECOLOGY | Facility: CLINIC | Age: 29
End: 2019-03-19
Payer: MEDICAID

## 2019-03-19 VITALS — BODY MASS INDEX: 21.95 KG/M2 | WEIGHT: 136 LBS

## 2019-03-19 DIAGNOSIS — Z30.42 ENCOUNTER FOR MANAGEMENT AND INJECTION OF DEPO-PROVERA: Primary | ICD-10-CM

## 2019-03-19 PROCEDURE — 99999 PR PBB SHADOW E&M-EST. PATIENT-LVL II: CPT | Mod: PBBFAC,,,

## 2019-03-19 PROCEDURE — 99212 OFFICE O/P EST SF 10 MIN: CPT | Mod: PBBFAC,25

## 2019-03-19 PROCEDURE — 99999 PR PBB SHADOW E&M-EST. PATIENT-LVL II: ICD-10-PCS | Mod: PBBFAC,,,

## 2019-03-19 PROCEDURE — 96372 THER/PROPH/DIAG INJ SC/IM: CPT | Mod: PBBFAC

## 2019-03-19 RX ADMIN — MEDROXYPROGESTERONE ACETATE 150 MG: 150 INJECTION, SUSPENSION INTRAMUSCULAR at 10:03

## 2019-03-19 NOTE — PROGRESS NOTES
PT ARRIVED @  0947   , PT'S ORIGINAL APPT WAS @ 5065 , PT WAS ROOMED @    REVIEWED WITH PT DEPO PROVERA,  HAND OUT GIVEN TO PT ABOUT DEPO PROVERA, REVIEWED MEDICATION DOCUMENTATION TO CONFIRM CORRECT MEDICATION, INJECTION GIVEN VIA  L GLUTEAL  W/O INCIDENT

## 2019-06-11 ENCOUNTER — CLINICAL SUPPORT (OUTPATIENT)
Dept: OBSTETRICS AND GYNECOLOGY | Facility: CLINIC | Age: 29
End: 2019-06-11
Payer: MEDICAID

## 2019-06-11 VITALS — WEIGHT: 136.69 LBS | BODY MASS INDEX: 22.06 KG/M2

## 2019-06-11 DIAGNOSIS — Z30.42 ENCOUNTER FOR MANAGEMENT AND INJECTION OF DEPO-PROVERA: Primary | ICD-10-CM

## 2019-06-11 PROCEDURE — 99999 PR PBB SHADOW E&M-EST. PATIENT-LVL II: ICD-10-PCS | Mod: PBBFAC,,,

## 2019-06-11 PROCEDURE — 96372 THER/PROPH/DIAG INJ SC/IM: CPT | Mod: PBBFAC

## 2019-06-11 PROCEDURE — 99212 OFFICE O/P EST SF 10 MIN: CPT | Mod: PBBFAC,25

## 2019-06-11 PROCEDURE — 99999 PR PBB SHADOW E&M-EST. PATIENT-LVL II: CPT | Mod: PBBFAC,,,

## 2019-06-11 RX ADMIN — MEDROXYPROGESTERONE ACETATE 150 MG: 150 INJECTION, SUSPENSION INTRAMUSCULAR at 10:06

## 2019-06-11 NOTE — PROGRESS NOTES
PT ARRIVED @  1029  , PT'S ORIGINAL APPT WAS @  1020 , PT WAS ROOMED @  1035    REVIEWED WITH PT DEPO PROVERA,  HAND OUT GIVEN TO PT ABOUT DEPO PROVERA, REVIEWED MEDICATION DOCUMENTATION TO CONFIRM CORRECT MEDICATION, INJECTION GIVEN VIA   R GLUTEAL  W/O INCIDENT

## 2019-09-12 ENCOUNTER — CLINICAL SUPPORT (OUTPATIENT)
Dept: OBSTETRICS AND GYNECOLOGY | Facility: CLINIC | Age: 29
End: 2019-09-12
Payer: MEDICAID

## 2019-09-12 VITALS — BODY MASS INDEX: 21.63 KG/M2 | WEIGHT: 134 LBS

## 2019-09-12 DIAGNOSIS — Z30.42 ENCOUNTER FOR MANAGEMENT AND INJECTION OF DEPO-PROVERA: Primary | ICD-10-CM

## 2019-09-12 PROCEDURE — 99999 PR PBB SHADOW E&M-EST. PATIENT-LVL II: CPT | Mod: PBBFAC,,,

## 2019-09-12 PROCEDURE — 99999 PR PBB SHADOW E&M-EST. PATIENT-LVL II: ICD-10-PCS | Mod: PBBFAC,,,

## 2019-09-12 PROCEDURE — 99212 OFFICE O/P EST SF 10 MIN: CPT | Mod: PBBFAC

## 2019-09-12 PROCEDURE — 96372 THER/PROPH/DIAG INJ SC/IM: CPT | Mod: PBBFAC

## 2019-09-12 RX ORDER — MEDROXYPROGESTERONE ACETATE 150 MG/ML
150 INJECTION, SUSPENSION INTRAMUSCULAR
Status: COMPLETED | OUTPATIENT
Start: 2019-09-12 | End: 2019-09-12

## 2019-09-12 RX ADMIN — MEDROXYPROGESTERONE ACETATE 150 MG: 150 INJECTION, SUSPENSION INTRAMUSCULAR at 01:09

## 2019-09-12 NOTE — PROGRESS NOTES
PT ARRIVED @ 1345   , PT'S ORIGINAL APPT WAS @  1400 , PT WAS ROOMED @  1348    REVIEWED WITH PT DEPO PROVERA,  HAND OUT GIVEN TO PT ABOUT DEPO PROVERA, REVIEWED MEDICATION DOCUMENTATION TO CONFIRM CORRECT MEDICATION, INJECTION GIVEN VIA    L GLUTEAL          W/O INCIDENT

## 2019-12-03 ENCOUNTER — CLINICAL SUPPORT (OUTPATIENT)
Dept: OBSTETRICS AND GYNECOLOGY | Facility: CLINIC | Age: 29
End: 2019-12-03
Payer: MEDICAID

## 2019-12-03 VITALS — WEIGHT: 136.69 LBS | BODY MASS INDEX: 22.06 KG/M2

## 2019-12-03 DIAGNOSIS — Z30.42 ENCOUNTER FOR MANAGEMENT AND INJECTION OF DEPO-PROVERA: Primary | ICD-10-CM

## 2019-12-03 PROCEDURE — 99212 OFFICE O/P EST SF 10 MIN: CPT | Mod: PBBFAC

## 2019-12-03 PROCEDURE — 96372 THER/PROPH/DIAG INJ SC/IM: CPT | Mod: PBBFAC

## 2019-12-03 PROCEDURE — 99999 PR PBB SHADOW E&M-EST. PATIENT-LVL II: ICD-10-PCS | Mod: PBBFAC,,,

## 2019-12-03 PROCEDURE — 99999 PR PBB SHADOW E&M-EST. PATIENT-LVL II: CPT | Mod: PBBFAC,,,

## 2019-12-03 RX ORDER — MEDROXYPROGESTERONE ACETATE 150 MG/ML
150 INJECTION, SUSPENSION INTRAMUSCULAR
Status: SHIPPED | OUTPATIENT
Start: 2019-12-03

## 2019-12-03 RX ADMIN — MEDROXYPROGESTERONE ACETATE 150 MG: 150 INJECTION, SUSPENSION INTRAMUSCULAR at 01:12

## 2019-12-03 NOTE — PROGRESS NOTES
PT'S ORIGINAL APPT WAS @  3521   , PT ARRIVED @ 1331     , PT WAS ROOMED @    1347  .    REVIEWED WITH PT DEPO PROVERA,  HAND OUT GIVEN TO PT ABOUT DEPO PROVERA, REVIEWED MEDICATION DOCUMENTATION TO CONFIRM CORRECT MEDICATION, INJECTION GIVEN VIA   R GLUTEAL W/O INCIDENT

## 2020-03-03 ENCOUNTER — CLINICAL SUPPORT (OUTPATIENT)
Dept: OBSTETRICS AND GYNECOLOGY | Facility: CLINIC | Age: 30
End: 2020-03-03
Payer: MEDICAID

## 2020-03-03 VITALS
WEIGHT: 136.69 LBS | BODY MASS INDEX: 22.06 KG/M2 | SYSTOLIC BLOOD PRESSURE: 110 MMHG | DIASTOLIC BLOOD PRESSURE: 62 MMHG

## 2020-03-03 DIAGNOSIS — Z30.42 ENCOUNTER FOR MANAGEMENT AND INJECTION OF DEPO-PROVERA: Primary | ICD-10-CM

## 2020-03-03 PROCEDURE — 99212 OFFICE O/P EST SF 10 MIN: CPT | Mod: PBBFAC

## 2020-03-03 PROCEDURE — 99999 PR PBB SHADOW E&M-EST. PATIENT-LVL II: CPT | Mod: PBBFAC,,,

## 2020-03-03 PROCEDURE — 96372 THER/PROPH/DIAG INJ SC/IM: CPT | Mod: PBBFAC

## 2020-03-03 PROCEDURE — 99999 PR PBB SHADOW E&M-EST. PATIENT-LVL II: ICD-10-PCS | Mod: PBBFAC,,,

## 2020-03-03 RX ADMIN — MEDROXYPROGESTERONE ACETATE 150 MG: 150 INJECTION, SUSPENSION INTRAMUSCULAR at 01:03

## 2020-03-03 NOTE — PROGRESS NOTES
PT'S ORIGINAL APPT WAS @  7415   , PT ARRIVED @   1325   , PT WAS ROOMED @ 1331     .  REVIEWED WITH PT DEPO PROVERA,  HAND OUT GIVEN TO PT ABOUT DEPO PROVERA, REVIEWED MEDICATION DOCUMENTATION TO CONFIRM CORRECT MEDICATION, INJECTION GIVEN VIA   L GLUTEAL W/O INCIDENT

## 2020-06-02 ENCOUNTER — CLINICAL SUPPORT (OUTPATIENT)
Dept: OBSTETRICS AND GYNECOLOGY | Facility: CLINIC | Age: 30
End: 2020-06-02
Payer: MEDICAID

## 2020-06-02 VITALS — BODY MASS INDEX: 23.74 KG/M2 | HEIGHT: 66 IN | WEIGHT: 147.69 LBS

## 2020-06-02 DIAGNOSIS — Z30.42 ENCOUNTER FOR MANAGEMENT AND INJECTION OF DEPO-PROVERA: Primary | ICD-10-CM

## 2020-06-02 PROCEDURE — 96372 THER/PROPH/DIAG INJ SC/IM: CPT | Mod: PBBFAC

## 2020-06-02 PROCEDURE — 99999 PR PBB SHADOW E&M-EST. PATIENT-LVL II: ICD-10-PCS | Mod: PBBFAC,,,

## 2020-06-02 PROCEDURE — 99999 PR PBB SHADOW E&M-EST. PATIENT-LVL II: CPT | Mod: PBBFAC,,,

## 2020-06-02 RX ADMIN — MEDROXYPROGESTERONE ACETATE 150 MG: 150 INJECTION, SUSPENSION INTRAMUSCULAR at 01:06

## 2020-09-01 ENCOUNTER — CLINICAL SUPPORT (OUTPATIENT)
Dept: OBSTETRICS AND GYNECOLOGY | Facility: CLINIC | Age: 30
End: 2020-09-01
Payer: MEDICAID

## 2020-09-01 VITALS — WEIGHT: 138 LBS | BODY MASS INDEX: 22.27 KG/M2

## 2020-09-01 DIAGNOSIS — Z30.42 ENCOUNTER FOR MANAGEMENT AND INJECTION OF DEPO-PROVERA: Primary | ICD-10-CM

## 2020-09-01 PROCEDURE — 99999 PR PBB SHADOW E&M-EST. PATIENT-LVL I: ICD-10-PCS | Mod: PBBFAC,,,

## 2020-09-01 PROCEDURE — 99999 PR PBB SHADOW E&M-EST. PATIENT-LVL I: CPT | Mod: PBBFAC,,,

## 2020-09-01 PROCEDURE — 96372 THER/PROPH/DIAG INJ SC/IM: CPT | Mod: PBBFAC

## 2020-09-01 RX ADMIN — MEDROXYPROGESTERONE ACETATE 150 MG: 150 INJECTION, SUSPENSION INTRAMUSCULAR at 09:09

## 2020-12-01 ENCOUNTER — CLINICAL SUPPORT (OUTPATIENT)
Dept: OBSTETRICS AND GYNECOLOGY | Facility: CLINIC | Age: 30
End: 2020-12-01
Payer: MEDICAID

## 2020-12-01 VITALS — WEIGHT: 147 LBS | BODY MASS INDEX: 23.73 KG/M2

## 2020-12-01 DIAGNOSIS — Z30.42 ENCOUNTER FOR MANAGEMENT AND INJECTION OF DEPO-PROVERA: Primary | ICD-10-CM

## 2020-12-01 PROCEDURE — 99999 PR PBB SHADOW E&M-EST. PATIENT-LVL I: CPT | Mod: PBBFAC,,,

## 2020-12-01 PROCEDURE — 99999 PR PBB SHADOW E&M-EST. PATIENT-LVL I: ICD-10-PCS | Mod: PBBFAC,,,

## 2020-12-01 PROCEDURE — 96372 THER/PROPH/DIAG INJ SC/IM: CPT | Mod: PBBFAC

## 2020-12-01 RX ADMIN — MEDROXYPROGESTERONE ACETATE 150 MG: 150 INJECTION, SUSPENSION INTRAMUSCULAR at 01:12

## 2021-03-01 ENCOUNTER — CLINICAL SUPPORT (OUTPATIENT)
Dept: OBSTETRICS AND GYNECOLOGY | Facility: CLINIC | Age: 31
End: 2021-03-01
Payer: MEDICAID

## 2021-03-01 VITALS — BODY MASS INDEX: 24.37 KG/M2 | WEIGHT: 151 LBS

## 2021-03-01 DIAGNOSIS — Z30.42 ENCOUNTER FOR MANAGEMENT AND INJECTION OF DEPO-PROVERA: Primary | ICD-10-CM

## 2021-03-01 PROCEDURE — 99999 PR PBB SHADOW E&M-EST. PATIENT-LVL I: CPT | Mod: PBBFAC,,,

## 2021-03-01 PROCEDURE — 99999 PR PBB SHADOW E&M-EST. PATIENT-LVL I: ICD-10-PCS | Mod: PBBFAC,,,

## 2021-03-01 PROCEDURE — 96372 THER/PROPH/DIAG INJ SC/IM: CPT | Mod: PBBFAC

## 2021-03-01 RX ADMIN — MEDROXYPROGESTERONE ACETATE 150 MG: 150 INJECTION, SUSPENSION INTRAMUSCULAR at 01:03

## 2021-04-16 ENCOUNTER — PATIENT MESSAGE (OUTPATIENT)
Dept: RESEARCH | Facility: HOSPITAL | Age: 31
End: 2021-04-16

## 2021-06-01 ENCOUNTER — OFFICE VISIT (OUTPATIENT)
Dept: OBSTETRICS AND GYNECOLOGY | Facility: CLINIC | Age: 31
End: 2021-06-01
Payer: MEDICAID

## 2021-06-01 ENCOUNTER — CLINICAL SUPPORT (OUTPATIENT)
Dept: OBSTETRICS AND GYNECOLOGY | Facility: CLINIC | Age: 31
End: 2021-06-01
Payer: MEDICAID

## 2021-06-01 VITALS
DIASTOLIC BLOOD PRESSURE: 68 MMHG | SYSTOLIC BLOOD PRESSURE: 124 MMHG | HEIGHT: 66 IN | WEIGHT: 156.06 LBS | BODY MASS INDEX: 25.08 KG/M2

## 2021-06-01 DIAGNOSIS — Z30.42 ENCOUNTER FOR MANAGEMENT AND INJECTION OF DEPO-PROVERA: Primary | ICD-10-CM

## 2021-06-01 DIAGNOSIS — Z30.09 FAMILY PLANNING: ICD-10-CM

## 2021-06-01 DIAGNOSIS — Z01.419 WELL WOMAN EXAM WITH ROUTINE GYNECOLOGICAL EXAM: Primary | ICD-10-CM

## 2021-06-01 PROCEDURE — 99385 PREV VISIT NEW AGE 18-39: CPT | Mod: S$PBB,,, | Performed by: OBSTETRICS & GYNECOLOGY

## 2021-06-01 PROCEDURE — 99999 PR PBB SHADOW E&M-EST. PATIENT-LVL I: CPT | Mod: PBBFAC,,,

## 2021-06-01 PROCEDURE — 99999 PR PBB SHADOW E&M-EST. PATIENT-LVL III: CPT | Mod: PBBFAC,,, | Performed by: OBSTETRICS & GYNECOLOGY

## 2021-06-01 PROCEDURE — 87491 CHLMYD TRACH DNA AMP PROBE: CPT | Performed by: OBSTETRICS & GYNECOLOGY

## 2021-06-01 PROCEDURE — 96372 THER/PROPH/DIAG INJ SC/IM: CPT | Mod: PBBFAC

## 2021-06-01 PROCEDURE — 99999 PR PBB SHADOW E&M-EST. PATIENT-LVL I: ICD-10-PCS | Mod: PBBFAC,,,

## 2021-06-01 PROCEDURE — 88141 CYTOPATH C/V INTERPRET: CPT | Mod: ,,, | Performed by: PATHOLOGY

## 2021-06-01 PROCEDURE — 99999 PR PBB SHADOW E&M-EST. PATIENT-LVL III: ICD-10-PCS | Mod: PBBFAC,,, | Performed by: OBSTETRICS & GYNECOLOGY

## 2021-06-01 PROCEDURE — 88175 CYTOPATH C/V AUTO FLUID REDO: CPT | Performed by: PATHOLOGY

## 2021-06-01 PROCEDURE — 87624 HPV HI-RISK TYP POOLED RSLT: CPT | Performed by: OBSTETRICS & GYNECOLOGY

## 2021-06-01 PROCEDURE — 99213 OFFICE O/P EST LOW 20 MIN: CPT | Mod: PBBFAC | Performed by: OBSTETRICS & GYNECOLOGY

## 2021-06-01 PROCEDURE — 88141 PR  CYTOPATH CERV/VAG INTERPRET: ICD-10-PCS | Mod: ,,, | Performed by: PATHOLOGY

## 2021-06-01 PROCEDURE — 87591 N.GONORRHOEAE DNA AMP PROB: CPT | Performed by: OBSTETRICS & GYNECOLOGY

## 2021-06-01 PROCEDURE — 99385 PR PREVENTIVE VISIT,NEW,18-39: ICD-10-PCS | Mod: S$PBB,,, | Performed by: OBSTETRICS & GYNECOLOGY

## 2021-06-01 RX ADMIN — MEDROXYPROGESTERONE ACETATE 150 MG: 150 INJECTION, SUSPENSION INTRAMUSCULAR at 02:06

## 2021-06-03 LAB
C TRACH DNA SPEC QL NAA+PROBE: NOT DETECTED
N GONORRHOEA DNA SPEC QL NAA+PROBE: NOT DETECTED

## 2021-06-08 LAB
HPV HR 12 DNA SPEC QL NAA+PROBE: NEGATIVE
HPV16 AG SPEC QL: NEGATIVE
HPV18 DNA SPEC QL NAA+PROBE: NEGATIVE

## 2021-06-15 LAB
FINAL PATHOLOGIC DIAGNOSIS: ABNORMAL
Lab: ABNORMAL

## 2021-06-16 ENCOUNTER — TELEPHONE (OUTPATIENT)
Dept: OBSTETRICS AND GYNECOLOGY | Facility: CLINIC | Age: 31
End: 2021-06-16

## 2021-07-21 ENCOUNTER — TELEPHONE (OUTPATIENT)
Dept: OBSTETRICS AND GYNECOLOGY | Facility: CLINIC | Age: 31
End: 2021-07-21

## 2021-10-06 ENCOUNTER — CLINICAL SUPPORT (OUTPATIENT)
Dept: OBSTETRICS AND GYNECOLOGY | Facility: CLINIC | Age: 31
End: 2021-10-06
Payer: MEDICAID

## 2021-10-06 VITALS — BODY MASS INDEX: 25.26 KG/M2 | WEIGHT: 156.5 LBS

## 2021-10-06 DIAGNOSIS — Z30.42 ENCOUNTER FOR MANAGEMENT AND INJECTION OF DEPO-PROVERA: Primary | ICD-10-CM

## 2021-10-06 LAB
B-HCG UR QL: NEGATIVE
CTP QC/QA: YES

## 2021-10-06 PROCEDURE — 99999 PR PBB SHADOW E&M-EST. PATIENT-LVL I: ICD-10-PCS | Mod: PBBFAC,,,

## 2021-10-06 PROCEDURE — 99999 PR PBB SHADOW E&M-EST. PATIENT-LVL I: CPT | Mod: PBBFAC,,,

## 2021-10-06 PROCEDURE — 81025 URINE PREGNANCY TEST: CPT | Mod: PBBFAC

## 2021-10-06 PROCEDURE — 96372 THER/PROPH/DIAG INJ SC/IM: CPT | Mod: PBBFAC

## 2021-10-06 RX ADMIN — MEDROXYPROGESTERONE ACETATE 150 MG: 150 INJECTION, SUSPENSION INTRAMUSCULAR at 01:10

## 2021-10-26 ENCOUNTER — OFFICE VISIT (OUTPATIENT)
Dept: OBSTETRICS AND GYNECOLOGY | Facility: CLINIC | Age: 31
End: 2021-10-26
Payer: MEDICAID

## 2021-10-26 VITALS
BODY MASS INDEX: 25.08 KG/M2 | DIASTOLIC BLOOD PRESSURE: 66 MMHG | SYSTOLIC BLOOD PRESSURE: 110 MMHG | HEIGHT: 66 IN | WEIGHT: 156.06 LBS

## 2021-10-26 DIAGNOSIS — R87.615 ENCOUNTER FOR REPEAT PAP SMEAR DUE TO PREVIOUS INSUFF CERVICAL CELLS: Primary | ICD-10-CM

## 2021-10-26 PROCEDURE — 99212 OFFICE O/P EST SF 10 MIN: CPT | Mod: PBBFAC | Performed by: OBSTETRICS & GYNECOLOGY

## 2021-10-26 PROCEDURE — 99999 PR PBB SHADOW E&M-EST. PATIENT-LVL II: ICD-10-PCS | Mod: PBBFAC,,, | Performed by: OBSTETRICS & GYNECOLOGY

## 2021-10-26 PROCEDURE — 99213 OFFICE O/P EST LOW 20 MIN: CPT | Mod: S$PBB,,, | Performed by: OBSTETRICS & GYNECOLOGY

## 2021-10-26 PROCEDURE — 99213 PR OFFICE/OUTPT VISIT, EST, LEVL III, 20-29 MIN: ICD-10-PCS | Mod: S$PBB,,, | Performed by: OBSTETRICS & GYNECOLOGY

## 2021-10-26 PROCEDURE — 88175 CYTOPATH C/V AUTO FLUID REDO: CPT | Performed by: OBSTETRICS & GYNECOLOGY

## 2021-10-26 PROCEDURE — 99999 PR PBB SHADOW E&M-EST. PATIENT-LVL II: CPT | Mod: PBBFAC,,, | Performed by: OBSTETRICS & GYNECOLOGY

## 2021-11-01 LAB
FINAL PATHOLOGIC DIAGNOSIS: NORMAL
Lab: NORMAL

## 2021-12-20 ENCOUNTER — LAB VISIT (OUTPATIENT)
Dept: PRIMARY CARE CLINIC | Facility: OTHER | Age: 31
End: 2021-12-20
Attending: INTERNAL MEDICINE
Payer: MEDICAID

## 2021-12-20 DIAGNOSIS — R05.9 COUGH: ICD-10-CM

## 2021-12-20 LAB
SARS-COV-2 RNA RESP QL NAA+PROBE: DETECTED
SARS-COV-2- CYCLE NUMBER: 32

## 2021-12-20 PROCEDURE — U0003 INFECTIOUS AGENT DETECTION BY NUCLEIC ACID (DNA OR RNA); SEVERE ACUTE RESPIRATORY SYNDROME CORONAVIRUS 2 (SARS-COV-2) (CORONAVIRUS DISEASE [COVID-19]), AMPLIFIED PROBE TECHNIQUE, MAKING USE OF HIGH THROUGHPUT TECHNOLOGIES AS DESCRIBED BY CMS-2020-01-R: HCPCS | Performed by: INTERNAL MEDICINE

## 2021-12-29 ENCOUNTER — LAB VISIT (OUTPATIENT)
Dept: PRIMARY CARE CLINIC | Facility: OTHER | Age: 31
End: 2021-12-29
Attending: INTERNAL MEDICINE
Payer: MEDICAID

## 2021-12-29 DIAGNOSIS — Z20.822 ENCOUNTER FOR LABORATORY TESTING FOR COVID-19 VIRUS: ICD-10-CM

## 2021-12-29 PROCEDURE — U0003 INFECTIOUS AGENT DETECTION BY NUCLEIC ACID (DNA OR RNA); SEVERE ACUTE RESPIRATORY SYNDROME CORONAVIRUS 2 (SARS-COV-2) (CORONAVIRUS DISEASE [COVID-19]), AMPLIFIED PROBE TECHNIQUE, MAKING USE OF HIGH THROUGHPUT TECHNOLOGIES AS DESCRIBED BY CMS-2020-01-R: HCPCS | Performed by: INTERNAL MEDICINE

## 2022-01-01 LAB
SARS-COV-2 RNA RESP QL NAA+PROBE: NOT DETECTED
SARS-COV-2- CYCLE NUMBER: NORMAL

## 2022-01-05 ENCOUNTER — CLINICAL SUPPORT (OUTPATIENT)
Dept: OBSTETRICS AND GYNECOLOGY | Facility: CLINIC | Age: 32
End: 2022-01-05
Payer: MEDICAID

## 2022-01-05 VITALS — BODY MASS INDEX: 23.38 KG/M2 | WEIGHT: 144.81 LBS

## 2022-01-05 DIAGNOSIS — Z30.42 ENCOUNTER FOR MANAGEMENT AND INJECTION OF DEPO-PROVERA: Primary | ICD-10-CM

## 2022-01-05 PROCEDURE — 96372 THER/PROPH/DIAG INJ SC/IM: CPT | Mod: PBBFAC

## 2022-01-05 PROCEDURE — 99999 PR PBB SHADOW E&M-EST. PATIENT-LVL I: CPT | Mod: PBBFAC,,,

## 2022-01-05 PROCEDURE — 99999 PR PBB SHADOW E&M-EST. PATIENT-LVL I: ICD-10-PCS | Mod: PBBFAC,,,

## 2022-01-05 RX ADMIN — MEDROXYPROGESTERONE ACETATE 150 MG: 150 INJECTION, SUSPENSION INTRAMUSCULAR at 01:01

## 2022-01-05 NOTE — PROGRESS NOTES
Depo-provera injection administered without difficutly. Pt instructed to sit in waiting room for 15 minutes to monitor for s/s of adverse reaction. Next appointment made, stressed importance of staying within cain period. Pt verb understanding.